# Patient Record
Sex: FEMALE | Race: WHITE | Employment: STUDENT | URBAN - METROPOLITAN AREA
[De-identification: names, ages, dates, MRNs, and addresses within clinical notes are randomized per-mention and may not be internally consistent; named-entity substitution may affect disease eponyms.]

---

## 2019-01-21 PROBLEM — F41.9 ANXIETY: Status: ACTIVE | Noted: 2019-01-21

## 2019-01-21 PROBLEM — F32.A DEPRESSION: Status: ACTIVE | Noted: 2019-01-21

## 2021-05-07 PROCEDURE — 87591 N.GONORRHOEAE DNA AMP PROB: CPT | Performed by: NURSE PRACTITIONER

## 2021-05-07 PROCEDURE — 87491 CHLMYD TRACH DNA AMP PROBE: CPT | Performed by: NURSE PRACTITIONER

## 2021-05-07 PROCEDURE — 88175 CYTOPATH C/V AUTO FLUID REDO: CPT | Performed by: NURSE PRACTITIONER

## 2021-06-24 ENCOUNTER — OFFICE VISIT (OUTPATIENT)
Dept: OCCUPATIONAL MEDICINE | Age: 21
End: 2021-06-24
Attending: PHYSICIAN ASSISTANT

## 2021-06-24 DIAGNOSIS — S39.012A LOW BACK STRAIN, INITIAL ENCOUNTER: ICD-10-CM

## 2021-06-24 DIAGNOSIS — S29.012A STRAIN OF THORACIC BACK REGION: Primary | ICD-10-CM

## 2021-07-22 ENCOUNTER — OFFICE VISIT (OUTPATIENT)
Dept: FAMILY MEDICINE CLINIC | Facility: CLINIC | Age: 21
End: 2021-07-22
Payer: COMMERCIAL

## 2021-07-22 VITALS
SYSTOLIC BLOOD PRESSURE: 110 MMHG | RESPIRATION RATE: 16 BRPM | HEIGHT: 66.5 IN | DIASTOLIC BLOOD PRESSURE: 70 MMHG | HEART RATE: 76 BPM | BODY MASS INDEX: 20.17 KG/M2 | TEMPERATURE: 98 F | WEIGHT: 127 LBS

## 2021-07-22 DIAGNOSIS — Z00.00 WELL WOMAN EXAM WITHOUT GYNECOLOGICAL EXAM: Primary | ICD-10-CM

## 2021-07-22 PROBLEM — F33.41 RECURRENT MAJOR DEPRESSION IN PARTIAL REMISSION (HCC): Status: ACTIVE | Noted: 2017-06-21

## 2021-07-22 PROBLEM — F33.41 RECURRENT MAJOR DEPRESSION IN PARTIAL REMISSION (HCC): Status: RESOLVED | Noted: 2017-06-21 | Resolved: 2021-07-22

## 2021-07-22 PROBLEM — F32.A DEPRESSION: Status: RESOLVED | Noted: 2019-01-21 | Resolved: 2021-07-22

## 2021-07-22 PROBLEM — F41.9 ANXIETY: Status: RESOLVED | Noted: 2019-01-21 | Resolved: 2021-07-22

## 2021-07-22 PROCEDURE — 99385 PREV VISIT NEW AGE 18-39: CPT | Performed by: FAMILY MEDICINE

## 2021-07-22 PROCEDURE — 3078F DIAST BP <80 MM HG: CPT | Performed by: FAMILY MEDICINE

## 2021-07-22 PROCEDURE — 3074F SYST BP LT 130 MM HG: CPT | Performed by: FAMILY MEDICINE

## 2021-07-22 PROCEDURE — 3008F BODY MASS INDEX DOCD: CPT | Performed by: FAMILY MEDICINE

## 2021-07-22 RX ORDER — ESTAZOLAM 2 MG/1
1 TABLET ORAL DAILY
COMMUNITY
Start: 2021-07-16

## 2021-07-22 NOTE — PROGRESS NOTES
SUBJECTIVE:  Patient presents with:  Establish Care: New Pt  Physical: WWE no pap     HPI:  Bump to R hand, 4th digit. Notes pruritus and some pain. Thinks this is a bug bite. Health Maintenance:  Vaccines: reviewed as below.  Indicated today:     Imm COMMENTS)    Comment:vomiting    OBJECTIVE:  PHYSICAL EXAM:   07/22/21  1404   BP: 110/70   Pulse: 76   Resp: 16   Temp: 98 °F (36.7 °C)   TempSrc: Temporal   Weight: 127 lb (57.6 kg)   Height: 5' 6.5\" (1.689 m)     Physical Examination: General appearanc

## 2021-11-17 ENCOUNTER — TELEPHONE (OUTPATIENT)
Dept: FAMILY MEDICINE CLINIC | Facility: CLINIC | Age: 21
End: 2021-11-17

## 2021-11-17 NOTE — TELEPHONE ENCOUNTER
Pt c/o pain on urination in the morning x several mornings- pain gets better throughout day. Pt also c/o increased frequency with urination, no fevers, no discharge, no odor.  Offered an appt for tomorrow- Pt declined and wants to keep appt for 11/19/21  Ad

## 2021-11-19 ENCOUNTER — OFFICE VISIT (OUTPATIENT)
Dept: FAMILY MEDICINE CLINIC | Facility: CLINIC | Age: 21
End: 2021-11-19
Payer: COMMERCIAL

## 2021-11-19 VITALS
HEART RATE: 60 BPM | HEIGHT: 66.5 IN | SYSTOLIC BLOOD PRESSURE: 110 MMHG | RESPIRATION RATE: 16 BRPM | WEIGHT: 132 LBS | TEMPERATURE: 98 F | BODY MASS INDEX: 20.96 KG/M2 | DIASTOLIC BLOOD PRESSURE: 70 MMHG

## 2021-11-19 DIAGNOSIS — N89.8 VAGINAL PRURITUS: ICD-10-CM

## 2021-11-19 DIAGNOSIS — K64.4 EXTERNAL HEMORRHOID: ICD-10-CM

## 2021-11-19 DIAGNOSIS — R35.0 URINARY FREQUENCY: Primary | ICD-10-CM

## 2021-11-19 PROCEDURE — 99214 OFFICE O/P EST MOD 30 MIN: CPT | Performed by: FAMILY MEDICINE

## 2021-11-19 PROCEDURE — 87660 TRICHOMONAS VAGIN DIR PROBE: CPT | Performed by: FAMILY MEDICINE

## 2021-11-19 PROCEDURE — 87086 URINE CULTURE/COLONY COUNT: CPT | Performed by: FAMILY MEDICINE

## 2021-11-19 PROCEDURE — 81003 URINALYSIS AUTO W/O SCOPE: CPT | Performed by: FAMILY MEDICINE

## 2021-11-19 PROCEDURE — 87480 CANDIDA DNA DIR PROBE: CPT | Performed by: FAMILY MEDICINE

## 2021-11-19 PROCEDURE — 87510 GARDNER VAG DNA DIR PROBE: CPT | Performed by: FAMILY MEDICINE

## 2021-11-19 PROCEDURE — 3008F BODY MASS INDEX DOCD: CPT | Performed by: FAMILY MEDICINE

## 2021-11-19 PROCEDURE — 3078F DIAST BP <80 MM HG: CPT | Performed by: FAMILY MEDICINE

## 2021-11-19 PROCEDURE — 3074F SYST BP LT 130 MM HG: CPT | Performed by: FAMILY MEDICINE

## 2021-11-19 RX ORDER — NITROFURANTOIN 25; 75 MG/1; MG/1
100 CAPSULE ORAL 2 TIMES DAILY
Qty: 10 CAPSULE | Refills: 0 | Status: SHIPPED | OUTPATIENT
Start: 2021-11-19

## 2021-11-19 RX ORDER — FLUCONAZOLE 150 MG/1
150 TABLET ORAL ONCE
Qty: 1 TABLET | Refills: 0 | Status: SHIPPED | OUTPATIENT
Start: 2021-11-19 | End: 2021-11-19

## 2021-11-19 NOTE — PROGRESS NOTES
Chief Complaint:  Patient presents with:  Urinary Frequency: Urinary frequency and burning    HPI:  This is a 24year old female patient presenting for Urinary Frequency (Urinary frequency and burning)    Symptoms started about a week ago.  Notes some vagin by mouth 2 (two) times daily. 10 capsule 0   • fluconazole (DIFLUCAN) 150 MG Oral Tab Take 1 tablet (150 mg total) by mouth once for 1 dose. 1 tablet 0   • MICROGESTIN 1/20 1-20 MG-MCG Oral Tab Take 1 tablet by mouth daily.        Allergies:    Bleach times daily given it is the weekend    Vaginal pruritus  -    Will start fluconazole (DIFLUCAN) 150 MG Oral Tab; Take 1 tablet (150 mg total) by mouth once for 1 dose. External hemorrhoid  To start topical preparation H, soft BMs, increae fiber, water.

## 2022-09-15 RX ORDER — OSELTAMIVIR PHOSPHATE 75 MG/1
75 CAPSULE ORAL DAILY
COMMUNITY
Start: 2022-04-25

## 2022-10-12 PROCEDURE — 87491 CHLMYD TRACH DNA AMP PROBE: CPT

## 2022-10-12 PROCEDURE — 87591 N.GONORRHOEAE DNA AMP PROB: CPT

## 2023-03-16 ENCOUNTER — HOSPITAL ENCOUNTER (EMERGENCY)
Facility: HOSPITAL | Age: 23
Discharge: HOME | End: 2023-03-16
Attending: EMERGENCY MEDICINE
Payer: COMMERCIAL

## 2023-03-16 ENCOUNTER — PATIENT MESSAGE (OUTPATIENT)
Dept: FAMILY MEDICINE CLINIC | Facility: CLINIC | Age: 23
End: 2023-03-16

## 2023-03-16 VITALS
HEIGHT: 66 IN | HEART RATE: 58 BPM | SYSTOLIC BLOOD PRESSURE: 118 MMHG | RESPIRATION RATE: 16 BRPM | TEMPERATURE: 98 F | OXYGEN SATURATION: 100 % | WEIGHT: 120 LBS | DIASTOLIC BLOOD PRESSURE: 80 MMHG | BODY MASS INDEX: 19.29 KG/M2

## 2023-03-16 DIAGNOSIS — R55 SYNCOPE AND COLLAPSE: Primary | ICD-10-CM

## 2023-03-16 DIAGNOSIS — R11.2 NAUSEA VOMITING AND DIARRHEA: ICD-10-CM

## 2023-03-16 DIAGNOSIS — K92.2 UPPER GI BLEED: ICD-10-CM

## 2023-03-16 DIAGNOSIS — R19.7 NAUSEA VOMITING AND DIARRHEA: ICD-10-CM

## 2023-03-16 LAB
ALBUMIN SERPL-MCNC: 4.2 G/DL (ref 3.4–5)
ALBUMIN/GLOB SERPL: 1.1 {RATIO} (ref 1–2)
ALP LIVER SERPL-CCNC: 57 U/L
ALT SERPL-CCNC: 72 U/L
ANION GAP SERPL CALC-SCNC: 7 MMOL/L (ref 0–18)
AST SERPL-CCNC: 37 U/L (ref 15–37)
ATRIAL RATE: 56 BPM
B-HCG UR QL: NEGATIVE
BASOPHILS # BLD AUTO: 0.02 X10(3) UL (ref 0–0.2)
BASOPHILS NFR BLD AUTO: 0.3 %
BILIRUB SERPL-MCNC: 1.1 MG/DL (ref 0.1–2)
BILIRUB UR QL STRIP.AUTO: NEGATIVE
BUN BLD-MCNC: 9 MG/DL (ref 7–18)
CALCIUM BLD-MCNC: 8.9 MG/DL (ref 8.5–10.1)
CHLORIDE SERPL-SCNC: 108 MMOL/L (ref 98–112)
CLARITY UR REFRACT.AUTO: CLEAR
CO2 SERPL-SCNC: 24 MMOL/L (ref 21–32)
COLOR UR AUTO: YELLOW
CREAT BLD-MCNC: 0.88 MG/DL
EOSINOPHIL # BLD AUTO: 0.09 X10(3) UL (ref 0–0.7)
EOSINOPHIL NFR BLD AUTO: 1.4 %
ERYTHROCYTE [DISTWIDTH] IN BLOOD BY AUTOMATED COUNT: 11.7 %
GFR SERPLBLD BASED ON 1.73 SQ M-ARVRAT: 95 ML/MIN/1.73M2 (ref 60–?)
GLOBULIN PLAS-MCNC: 3.7 G/DL (ref 2.8–4.4)
GLUCOSE BLD-MCNC: 92 MG/DL (ref 70–99)
GLUCOSE UR STRIP.AUTO-MCNC: NEGATIVE MG/DL
HCT VFR BLD AUTO: 44 %
HGB BLD-MCNC: 15.1 G/DL
HYALINE CASTS #/AREA URNS AUTO: PRESENT /LPF
IMM GRANULOCYTES # BLD AUTO: 0.02 X10(3) UL (ref 0–1)
IMM GRANULOCYTES NFR BLD: 0.3 %
KETONES UR STRIP.AUTO-MCNC: 20 MG/DL
LEUKOCYTE ESTERASE UR QL STRIP.AUTO: NEGATIVE
LIPASE SERPL-CCNC: 39 U/L (ref 13–75)
LYMPHOCYTES # BLD AUTO: 1.9 X10(3) UL (ref 1–4)
LYMPHOCYTES NFR BLD AUTO: 29.1 %
MCH RBC QN AUTO: 29.5 PG (ref 26–34)
MCHC RBC AUTO-ENTMCNC: 34.3 G/DL (ref 31–37)
MCV RBC AUTO: 86.1 FL
MONOCYTES # BLD AUTO: 0.45 X10(3) UL (ref 0.1–1)
MONOCYTES NFR BLD AUTO: 6.9 %
NEUTROPHILS # BLD AUTO: 4.04 X10 (3) UL (ref 1.5–7.7)
NEUTROPHILS # BLD AUTO: 4.04 X10(3) UL (ref 1.5–7.7)
NEUTROPHILS NFR BLD AUTO: 62 %
NITRITE UR QL STRIP.AUTO: NEGATIVE
OSMOLALITY SERPL CALC.SUM OF ELEC: 286 MOSM/KG (ref 275–295)
P AXIS: 77 DEGREES
P-R INTERVAL: 150 MS
PH UR STRIP.AUTO: 5 [PH] (ref 5–8)
PLATELET # BLD AUTO: 293 10(3)UL (ref 150–450)
POTASSIUM SERPL-SCNC: 3.6 MMOL/L (ref 3.5–5.1)
PROT SERPL-MCNC: 7.9 G/DL (ref 6.4–8.2)
PROT UR STRIP.AUTO-MCNC: NEGATIVE MG/DL
Q-T INTERVAL: 422 MS
QRS DURATION: 88 MS
QTC CALCULATION (BEZET): 407 MS
R AXIS: 96 DEGREES
RBC # BLD AUTO: 5.11 X10(6)UL
SODIUM SERPL-SCNC: 139 MMOL/L (ref 136–145)
SP GR UR STRIP.AUTO: 1 (ref 1–1.03)
T AXIS: 55 DEGREES
UROBILINOGEN UR STRIP.AUTO-MCNC: <2 MG/DL
VENTRICULAR RATE: 56 BPM
WBC # BLD AUTO: 6.5 X10(3) UL (ref 4–11)

## 2023-03-16 PROCEDURE — 99284 EMERGENCY DEPT VISIT MOD MDM: CPT

## 2023-03-16 PROCEDURE — 96360 HYDRATION IV INFUSION INIT: CPT

## 2023-03-16 PROCEDURE — 93010 ELECTROCARDIOGRAM REPORT: CPT

## 2023-03-16 PROCEDURE — 83690 ASSAY OF LIPASE: CPT | Performed by: EMERGENCY MEDICINE

## 2023-03-16 PROCEDURE — 80053 COMPREHEN METABOLIC PANEL: CPT | Performed by: EMERGENCY MEDICINE

## 2023-03-16 PROCEDURE — 93005 ELECTROCARDIOGRAM TRACING: CPT

## 2023-03-16 PROCEDURE — 96361 HYDRATE IV INFUSION ADD-ON: CPT

## 2023-03-16 PROCEDURE — 81001 URINALYSIS AUTO W/SCOPE: CPT | Performed by: EMERGENCY MEDICINE

## 2023-03-16 PROCEDURE — 85025 COMPLETE CBC W/AUTO DIFF WBC: CPT | Performed by: EMERGENCY MEDICINE

## 2023-03-16 PROCEDURE — 81025 URINE PREGNANCY TEST: CPT

## 2023-03-16 RX ORDER — OMEPRAZOLE 20 MG/1
20 TABLET, DELAYED RELEASE ORAL DAILY
Qty: 30 TABLET | Refills: 0 | Status: SHIPPED | OUTPATIENT
Start: 2023-03-16 | End: 2023-03-20

## 2023-03-16 RX ORDER — ONDANSETRON 4 MG/1
4 TABLET, ORALLY DISINTEGRATING ORAL EVERY 4 HOURS PRN
Qty: 10 TABLET | Refills: 0 | Status: SHIPPED | OUTPATIENT
Start: 2023-03-16 | End: 2023-03-23

## 2023-03-16 NOTE — ED INITIAL ASSESSMENT (HPI)
Pt c/o abd pain with n/v/d since Saturday, states pain more to the lower abd. Pt reports she woke up at 0515 to use the bathroom, had abd pain and diarrhea and passed out. Per boyfriend, \"found her on the bathroom floor next to the bath tub, was out for about a minute. \" Pt denies pain to head, neck and back. She denies dizziness/lightheadedness, denies CP, she denies JUAN CARLOS.  Reports emesis this am appears dark colored

## 2023-03-17 NOTE — TELEPHONE ENCOUNTER
From: Shine Gaston  To: Jodi Singh DO  Sent: 3/16/2023 5:37 PM CDT  Subject: Recent hospital visit    Hi Dr. Ashley Ordonez,  I was in the hospital today due to GI distress, likely from the flu. While I was at the hospital they did an EKG because I fainted. The EKG results did not seem like everything was normal, therefore, I was wondering if you would be able to look at my results and please let me know if I should make an appointment with you to go over it or if it is nothing to worry about.    Thanks for your help,  Africa Rose

## 2023-03-20 ENCOUNTER — OFFICE VISIT (OUTPATIENT)
Dept: FAMILY MEDICINE CLINIC | Facility: CLINIC | Age: 23
End: 2023-03-20
Payer: COMMERCIAL

## 2023-03-20 VITALS
WEIGHT: 122 LBS | RESPIRATION RATE: 16 BRPM | HEART RATE: 60 BPM | HEIGHT: 66 IN | SYSTOLIC BLOOD PRESSURE: 98 MMHG | DIASTOLIC BLOOD PRESSURE: 64 MMHG | TEMPERATURE: 97 F | BODY MASS INDEX: 19.61 KG/M2

## 2023-03-20 DIAGNOSIS — R55 SYNCOPE, UNSPECIFIED SYNCOPE TYPE: ICD-10-CM

## 2023-03-20 DIAGNOSIS — R10.30 LOWER ABDOMINAL PAIN: Primary | ICD-10-CM

## 2023-03-20 DIAGNOSIS — R11.2 NAUSEA AND VOMITING, UNSPECIFIED VOMITING TYPE: ICD-10-CM

## 2023-03-20 LAB
ATRIAL RATE: 53 BPM
P AXIS: 72 DEGREES
P-R INTERVAL: 152 MS
Q-T INTERVAL: 414 MS
QRS DURATION: 92 MS
QTC CALCULATION (BEZET): 388 MS
R AXIS: 93 DEGREES
T AXIS: 49 DEGREES
VENTRICULAR RATE: 53 BPM

## 2023-03-20 PROCEDURE — 99214 OFFICE O/P EST MOD 30 MIN: CPT | Performed by: NURSE PRACTITIONER

## 2023-03-20 PROCEDURE — 3078F DIAST BP <80 MM HG: CPT | Performed by: NURSE PRACTITIONER

## 2023-03-20 PROCEDURE — 93000 ELECTROCARDIOGRAM COMPLETE: CPT | Performed by: NURSE PRACTITIONER

## 2023-03-20 PROCEDURE — 3008F BODY MASS INDEX DOCD: CPT | Performed by: NURSE PRACTITIONER

## 2023-03-20 PROCEDURE — 3074F SYST BP LT 130 MM HG: CPT | Performed by: NURSE PRACTITIONER

## 2023-03-20 RX ORDER — OMEPRAZOLE 20 MG/1
20 CAPSULE, DELAYED RELEASE ORAL DAILY
COMMUNITY
Start: 2023-03-16

## 2023-03-20 RX ORDER — DICYCLOMINE HCL 20 MG
20 TABLET ORAL 3 TIMES DAILY PRN
Qty: 21 TABLET | Refills: 0 | Status: SHIPPED | OUTPATIENT
Start: 2023-03-20

## 2023-03-22 ENCOUNTER — PATIENT MESSAGE (OUTPATIENT)
Dept: FAMILY MEDICINE CLINIC | Facility: CLINIC | Age: 23
End: 2023-03-22

## 2023-03-22 DIAGNOSIS — R10.84 GENERALIZED ABDOMINAL PAIN: Primary | ICD-10-CM

## 2023-03-22 NOTE — TELEPHONE ENCOUNTER
From: Shine Gaston  To: Jaime Akhtar NP  Sent: 3/22/2023 1:53 PM CDT  Subject: Pain and discomfort moved    Hi Landen,  I am now having pain at the bottom and middle of my rib cage and it hurts when I move and when I touch it. The right side is more sensitive as well. I am just getting concerned because the medication does not seem to be helping and I am supposed to leave for my trip on Monday. Please let me know if there is something else I should do.    Thanks,  Ronnie Ulloa

## 2023-03-23 ENCOUNTER — TELEPHONE (OUTPATIENT)
Dept: FAMILY MEDICINE CLINIC | Facility: CLINIC | Age: 23
End: 2023-03-23

## 2023-03-23 NOTE — TELEPHONE ENCOUNTER
Patient was seen 03/20/23 and states that her symptoms have worsened, was told to contact the office is no improvement. Patient spoke with the on call doctor last night and was prescribed an additional medication that she stated has not helped.  Patient looking for next step

## 2023-03-23 NOTE — TELEPHONE ENCOUNTER
Talked to pt at 7 PM after call on evening. ABD pain anfter 5 days of N/V and pain now more upper abd. She is worried about jaundice or GB, appears more bloating. Bentyl not helpong.  Will add simethicome and reassess for possible US GB in 204 days if no better    Lab Results   Component Value Date    ALT 72 (H) 03/16/2023    ALT 21 03/31/2014    AST 37 03/16/2023    AST 16 03/31/2014

## 2023-07-28 ENCOUNTER — TELEPHONE (OUTPATIENT)
Dept: FAMILY MEDICINE CLINIC | Facility: CLINIC | Age: 23
End: 2023-07-28

## 2023-07-28 NOTE — TELEPHONE ENCOUNTER
Pt scheduled apt for 9/15 through my chart with following reason    Fainted again, dizziness, nausea, blurry vision, rapid heart rate, sweating.  Same symptoms in March     Should she be seen sooner

## 2023-07-31 ENCOUNTER — OFFICE VISIT (OUTPATIENT)
Dept: FAMILY MEDICINE CLINIC | Facility: CLINIC | Age: 23
End: 2023-07-31
Payer: COMMERCIAL

## 2023-07-31 VITALS
WEIGHT: 122.81 LBS | SYSTOLIC BLOOD PRESSURE: 100 MMHG | HEIGHT: 66 IN | DIASTOLIC BLOOD PRESSURE: 60 MMHG | TEMPERATURE: 97 F | OXYGEN SATURATION: 99 % | RESPIRATION RATE: 16 BRPM | BODY MASS INDEX: 19.74 KG/M2 | HEART RATE: 62 BPM

## 2023-07-31 DIAGNOSIS — R55 SYNCOPE, UNSPECIFIED SYNCOPE TYPE: Primary | ICD-10-CM

## 2023-07-31 DIAGNOSIS — Z01.84 IMMUNITY STATUS TESTING: ICD-10-CM

## 2023-08-07 ENCOUNTER — LABORATORY ENCOUNTER (OUTPATIENT)
Dept: LAB | Age: 23
End: 2023-08-07
Attending: FAMILY MEDICINE
Payer: COMMERCIAL

## 2023-08-07 DIAGNOSIS — R55 SYNCOPE, UNSPECIFIED SYNCOPE TYPE: ICD-10-CM

## 2023-08-07 DIAGNOSIS — Z01.84 IMMUNITY STATUS TESTING: ICD-10-CM

## 2023-08-07 LAB
ALBUMIN SERPL-MCNC: 4.6 G/DL (ref 3.4–5)
ALBUMIN/GLOB SERPL: 1.4 {RATIO} (ref 1–2)
ALP LIVER SERPL-CCNC: 46 U/L
ALT SERPL-CCNC: 38 U/L
ANION GAP SERPL CALC-SCNC: 8 MMOL/L (ref 0–18)
AST SERPL-CCNC: 19 U/L (ref 15–37)
BASOPHILS # BLD AUTO: 0.05 X10(3) UL (ref 0–0.2)
BASOPHILS NFR BLD AUTO: 0.9 %
BILIRUB SERPL-MCNC: 2.2 MG/DL (ref 0.1–2)
BUN BLD-MCNC: 12 MG/DL (ref 7–18)
CALCIUM BLD-MCNC: 9.4 MG/DL (ref 8.5–10.1)
CHLORIDE SERPL-SCNC: 102 MMOL/L (ref 98–112)
CO2 SERPL-SCNC: 25 MMOL/L (ref 21–32)
CREAT BLD-MCNC: 1.06 MG/DL
EGFRCR SERPLBLD CKD-EPI 2021: 76 ML/MIN/1.73M2 (ref 60–?)
EOSINOPHIL # BLD AUTO: 0.17 X10(3) UL (ref 0–0.7)
EOSINOPHIL NFR BLD AUTO: 3.1 %
ERYTHROCYTE [DISTWIDTH] IN BLOOD BY AUTOMATED COUNT: 11.9 %
FASTING STATUS PATIENT QL REPORTED: YES
GLOBULIN PLAS-MCNC: 3.3 G/DL (ref 2.8–4.4)
GLUCOSE BLD-MCNC: 86 MG/DL (ref 70–99)
HBV SURFACE AB SER QL: NONREACTIVE
HBV SURFACE AB SERPL IA-ACNC: <3.1 MIU/ML
HCT VFR BLD AUTO: 42.1 %
HGB BLD-MCNC: 14.2 G/DL
IMM GRANULOCYTES # BLD AUTO: 0.01 X10(3) UL (ref 0–1)
IMM GRANULOCYTES NFR BLD: 0.2 %
LYMPHOCYTES # BLD AUTO: 2.32 X10(3) UL (ref 1–4)
LYMPHOCYTES NFR BLD AUTO: 42.6 %
MCH RBC QN AUTO: 29.7 PG (ref 26–34)
MCHC RBC AUTO-ENTMCNC: 33.7 G/DL (ref 31–37)
MCV RBC AUTO: 88.1 FL
MONOCYTES # BLD AUTO: 0.33 X10(3) UL (ref 0.1–1)
MONOCYTES NFR BLD AUTO: 6.1 %
NEUTROPHILS # BLD AUTO: 2.56 X10 (3) UL (ref 1.5–7.7)
NEUTROPHILS # BLD AUTO: 2.56 X10(3) UL (ref 1.5–7.7)
NEUTROPHILS NFR BLD AUTO: 47.1 %
OSMOLALITY SERPL CALC.SUM OF ELEC: 279 MOSM/KG (ref 275–295)
PLATELET # BLD AUTO: 296 10(3)UL (ref 150–450)
POTASSIUM SERPL-SCNC: 3.7 MMOL/L (ref 3.5–5.1)
PROT SERPL-MCNC: 7.9 G/DL (ref 6.4–8.2)
RBC # BLD AUTO: 4.78 X10(6)UL
RUBV IGG SER QL: POSITIVE
RUBV IGG SER-ACNC: 475.5 IU/ML (ref 10–?)
SODIUM SERPL-SCNC: 135 MMOL/L (ref 136–145)
TSI SER-ACNC: 2.15 MIU/ML (ref 0.36–3.74)
WBC # BLD AUTO: 5.4 X10(3) UL (ref 4–11)

## 2023-08-07 PROCEDURE — 86706 HEP B SURFACE ANTIBODY: CPT | Performed by: NURSE PRACTITIONER

## 2023-08-07 PROCEDURE — 86762 RUBELLA ANTIBODY: CPT | Performed by: NURSE PRACTITIONER

## 2023-08-07 PROCEDURE — 86735 MUMPS ANTIBODY: CPT | Performed by: NURSE PRACTITIONER

## 2023-08-07 PROCEDURE — 80050 GENERAL HEALTH PANEL: CPT | Performed by: NURSE PRACTITIONER

## 2023-08-07 PROCEDURE — 86765 RUBEOLA ANTIBODY: CPT | Performed by: NURSE PRACTITIONER

## 2023-08-07 PROCEDURE — 86787 VARICELLA-ZOSTER ANTIBODY: CPT | Performed by: NURSE PRACTITIONER

## 2023-08-09 LAB
MEV IGG SER-ACNC: 127 AU/ML (ref 16.5–?)
MUV IGG SER IA-ACNC: 76.2 AU/ML (ref 11–?)
VZV IGG SER IA-ACNC: 426.1 (ref 165–?)

## 2023-08-10 ENCOUNTER — HOSPITAL ENCOUNTER (OUTPATIENT)
Dept: CV DIAGNOSTICS | Facility: HOSPITAL | Age: 23
Discharge: HOME | End: 2023-08-10
Attending: NURSE PRACTITIONER
Payer: COMMERCIAL

## 2023-08-10 DIAGNOSIS — R55 SYNCOPE, UNSPECIFIED SYNCOPE TYPE: ICD-10-CM

## 2023-08-10 PROCEDURE — 93226 XTRNL ECG REC<48 HR SCAN A/R: CPT | Performed by: NURSE PRACTITIONER

## 2023-08-10 PROCEDURE — 93225 XTRNL ECG REC<48 HRS REC: CPT | Performed by: NURSE PRACTITIONER

## 2023-08-11 ENCOUNTER — PATIENT MESSAGE (OUTPATIENT)
Dept: FAMILY MEDICINE CLINIC | Facility: CLINIC | Age: 23
End: 2023-08-11

## 2023-08-11 NOTE — TELEPHONE ENCOUNTER
From: Sophie Velez  To: Xenia Ridley  Sent: 8/11/2023 11:30 AM CDT  Subject: Hepatitis B Vaccine    Hi Ginny De Jesus,    I spoke with Dl Sevilla who said you should have the series of 3 vaccines.  You would receive the first one, the 2nd, one month after the first and the 3rd, six months after the first.    Livia Gunter RN

## 2023-08-11 NOTE — PROGRESS NOTES
Discussed test results with Quinten Peers by phone giving her Kelle Denton PA-C comments and recommendations. Quinten Peers verbalized understanding. I spoke with King RAGLAND and he said Quinten Peers should have the Hep B series. I sent Quinten Peers a [employer] message with that information.

## 2023-08-11 NOTE — TELEPHONE ENCOUNTER
OK for Tdap and Booster of Hep B, although, should check with school regarding Hep B. Most schools want entire series repeated and not just booster. Either is ok with me, depending on which school will accept. Vaccines ordered.

## 2023-08-17 ENCOUNTER — NURSE ONLY (OUTPATIENT)
Dept: FAMILY MEDICINE CLINIC | Facility: CLINIC | Age: 23
End: 2023-08-17
Payer: COMMERCIAL

## 2023-08-17 VITALS — TEMPERATURE: 99 F

## 2023-08-17 DIAGNOSIS — Z23 NEED FOR VACCINATION: Primary | ICD-10-CM

## 2023-08-17 PROCEDURE — 90746 HEPB VACCINE 3 DOSE ADULT IM: CPT | Performed by: NURSE PRACTITIONER

## 2023-08-17 PROCEDURE — 87591 N.GONORRHOEAE DNA AMP PROB: CPT

## 2023-08-17 PROCEDURE — 87491 CHLMYD TRACH DNA AMP PROBE: CPT

## 2023-08-17 PROCEDURE — 90472 IMMUNIZATION ADMIN EACH ADD: CPT | Performed by: NURSE PRACTITIONER

## 2023-08-17 PROCEDURE — 90471 IMMUNIZATION ADMIN: CPT | Performed by: NURSE PRACTITIONER

## 2023-08-17 PROCEDURE — 90715 TDAP VACCINE 7 YRS/> IM: CPT | Performed by: NURSE PRACTITIONER

## 2023-08-17 NOTE — PROGRESS NOTES
Patient is here today for Hep B and TDAP per Brionna Dunham NP. Patient given VIS. 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   Hep B given in the right deltoid and TDAP given in the left deltoid. Patient given a copy of immunizations.    She left the office without complaint

## 2023-08-29 ENCOUNTER — OFFICE VISIT (OUTPATIENT)
Dept: FAMILY MEDICINE CLINIC | Facility: CLINIC | Age: 23
End: 2023-08-29
Payer: COMMERCIAL

## 2023-08-29 VITALS
SYSTOLIC BLOOD PRESSURE: 128 MMHG | RESPIRATION RATE: 16 BRPM | BODY MASS INDEX: 19.44 KG/M2 | WEIGHT: 121 LBS | HEART RATE: 64 BPM | DIASTOLIC BLOOD PRESSURE: 68 MMHG | HEIGHT: 66 IN | OXYGEN SATURATION: 98 % | TEMPERATURE: 97 F

## 2023-08-29 DIAGNOSIS — H57.02 ANISOCORIA: ICD-10-CM

## 2023-08-29 DIAGNOSIS — R42 DIZZINESS: Primary | ICD-10-CM

## 2023-08-29 DIAGNOSIS — G44.52 NEW DAILY PERSISTENT HEADACHE: ICD-10-CM

## 2023-08-29 DIAGNOSIS — E87.1 HYPONATREMIA: ICD-10-CM

## 2023-08-29 PROCEDURE — 3008F BODY MASS INDEX DOCD: CPT | Performed by: FAMILY MEDICINE

## 2023-08-29 PROCEDURE — 3078F DIAST BP <80 MM HG: CPT | Performed by: FAMILY MEDICINE

## 2023-08-29 PROCEDURE — 99214 OFFICE O/P EST MOD 30 MIN: CPT | Performed by: FAMILY MEDICINE

## 2023-08-29 PROCEDURE — 3074F SYST BP LT 130 MM HG: CPT | Performed by: FAMILY MEDICINE

## 2023-09-06 ENCOUNTER — HOSPITAL ENCOUNTER (OUTPATIENT)
Dept: MRI IMAGING | Age: 23
Discharge: HOME | End: 2023-09-06
Attending: FAMILY MEDICINE
Payer: COMMERCIAL

## 2023-09-06 DIAGNOSIS — G44.52 NEW DAILY PERSISTENT HEADACHE: ICD-10-CM

## 2023-09-06 DIAGNOSIS — H57.02 ANISOCORIA: ICD-10-CM

## 2023-09-06 DIAGNOSIS — R42 DIZZINESS: ICD-10-CM

## 2023-09-06 PROCEDURE — 70551 MRI BRAIN STEM W/O DYE: CPT | Performed by: FAMILY MEDICINE

## 2023-09-08 ENCOUNTER — LABORATORY ENCOUNTER (OUTPATIENT)
Dept: LAB | Age: 23
End: 2023-09-08
Attending: FAMILY MEDICINE
Payer: COMMERCIAL

## 2023-09-08 DIAGNOSIS — R42 DIZZINESS: ICD-10-CM

## 2023-09-08 DIAGNOSIS — E87.1 HYPONATREMIA: ICD-10-CM

## 2023-09-08 LAB
ANION GAP SERPL CALC-SCNC: 6 MMOL/L (ref 0–18)
BUN BLD-MCNC: 8 MG/DL (ref 7–18)
CALCIUM BLD-MCNC: 10 MG/DL (ref 8.5–10.1)
CHLORIDE SERPL-SCNC: 104 MMOL/L (ref 98–112)
CO2 SERPL-SCNC: 28 MMOL/L (ref 21–32)
CREAT BLD-MCNC: 0.7 MG/DL
DEPRECATED HBV CORE AB SER IA-ACNC: 120.9 NG/ML
EGFRCR SERPLBLD CKD-EPI 2021: 125 ML/MIN/1.73M2 (ref 60–?)
FASTING STATUS PATIENT QL REPORTED: NO
GLUCOSE BLD-MCNC: 73 MG/DL (ref 70–99)
OSMOLALITY SERPL CALC.SUM OF ELEC: 283 MOSM/KG (ref 275–295)
POTASSIUM SERPL-SCNC: 4.2 MMOL/L (ref 3.5–5.1)
SODIUM SERPL-SCNC: 138 MMOL/L (ref 136–145)

## 2023-09-08 PROCEDURE — 82728 ASSAY OF FERRITIN: CPT | Performed by: FAMILY MEDICINE

## 2023-09-08 PROCEDURE — 80048 BASIC METABOLIC PNL TOTAL CA: CPT | Performed by: FAMILY MEDICINE

## 2023-09-15 ENCOUNTER — OFFICE VISIT (OUTPATIENT)
Dept: FAMILY MEDICINE CLINIC | Facility: CLINIC | Age: 23
End: 2023-09-15
Payer: COMMERCIAL

## 2023-09-15 ENCOUNTER — LAB ENCOUNTER (OUTPATIENT)
Dept: LAB | Age: 23
End: 2023-09-15
Attending: FAMILY MEDICINE
Payer: COMMERCIAL

## 2023-09-15 VITALS
RESPIRATION RATE: 16 BRPM | HEART RATE: 84 BPM | BODY MASS INDEX: 18.96 KG/M2 | DIASTOLIC BLOOD PRESSURE: 62 MMHG | SYSTOLIC BLOOD PRESSURE: 116 MMHG | OXYGEN SATURATION: 99 % | TEMPERATURE: 97 F | WEIGHT: 118 LBS | HEIGHT: 66 IN

## 2023-09-15 DIAGNOSIS — R10.12 LUQ PAIN: ICD-10-CM

## 2023-09-15 DIAGNOSIS — R63.4 UNEXPLAINED WEIGHT LOSS: ICD-10-CM

## 2023-09-15 DIAGNOSIS — G93.5: Primary | ICD-10-CM

## 2023-09-15 DIAGNOSIS — R19.7 DIARRHEA, UNSPECIFIED TYPE: ICD-10-CM

## 2023-09-15 LAB — IGA SERPL-MCNC: 105 MG/DL (ref 70–312)

## 2023-09-15 PROCEDURE — 86364 TISS TRNSGLTMNASE EA IG CLAS: CPT | Performed by: FAMILY MEDICINE

## 2023-09-15 PROCEDURE — 3008F BODY MASS INDEX DOCD: CPT | Performed by: FAMILY MEDICINE

## 2023-09-15 PROCEDURE — 99214 OFFICE O/P EST MOD 30 MIN: CPT | Performed by: FAMILY MEDICINE

## 2023-09-15 PROCEDURE — 3074F SYST BP LT 130 MM HG: CPT | Performed by: FAMILY MEDICINE

## 2023-09-15 PROCEDURE — 3078F DIAST BP <80 MM HG: CPT | Performed by: FAMILY MEDICINE

## 2023-09-15 PROCEDURE — 82784 ASSAY IGA/IGD/IGG/IGM EACH: CPT | Performed by: FAMILY MEDICINE

## 2023-09-18 LAB — TTG IGA SER-ACNC: <0.2 U/ML (ref ?–7)

## 2023-09-27 ENCOUNTER — OFFICE VISIT (OUTPATIENT)
Dept: ORTHOPEDICS CLINIC | Facility: CLINIC | Age: 23
End: 2023-09-27
Payer: COMMERCIAL

## 2023-09-27 VITALS — BODY MASS INDEX: 18.64 KG/M2 | WEIGHT: 116 LBS | HEIGHT: 66 IN

## 2023-09-27 DIAGNOSIS — S49.92XA INJURY OF LEFT ACROMIOCLAVICULAR JOINT, INITIAL ENCOUNTER: Primary | ICD-10-CM

## 2023-09-27 PROCEDURE — 99203 OFFICE O/P NEW LOW 30 MIN: CPT | Performed by: PHYSICIAN ASSISTANT

## 2023-09-27 PROCEDURE — 3008F BODY MASS INDEX DOCD: CPT | Performed by: PHYSICIAN ASSISTANT

## 2023-09-29 ENCOUNTER — OFFICE VISIT (OUTPATIENT)
Dept: SURGERY | Facility: CLINIC | Age: 23
End: 2023-09-29
Payer: COMMERCIAL

## 2023-09-29 VITALS
DIASTOLIC BLOOD PRESSURE: 80 MMHG | BODY MASS INDEX: 18.64 KG/M2 | SYSTOLIC BLOOD PRESSURE: 110 MMHG | HEIGHT: 66 IN | WEIGHT: 116 LBS

## 2023-09-29 DIAGNOSIS — R40.20 LOSS OF CONSCIOUSNESS (HCC): Primary | ICD-10-CM

## 2023-09-29 PROCEDURE — 3008F BODY MASS INDEX DOCD: CPT | Performed by: NEUROLOGICAL SURGERY

## 2023-09-29 PROCEDURE — 3079F DIAST BP 80-89 MM HG: CPT | Performed by: NEUROLOGICAL SURGERY

## 2023-09-29 PROCEDURE — 3074F SYST BP LT 130 MM HG: CPT | Performed by: NEUROLOGICAL SURGERY

## 2023-09-29 PROCEDURE — 99204 OFFICE O/P NEW MOD 45 MIN: CPT | Performed by: NEUROLOGICAL SURGERY

## 2023-10-04 DIAGNOSIS — R56.9 SEIZURE (HCC): ICD-10-CM

## 2023-10-04 DIAGNOSIS — R55 SYNCOPE AND COLLAPSE: Primary | ICD-10-CM

## 2023-10-16 ENCOUNTER — MED REC SCAN ONLY (OUTPATIENT)
Dept: ORTHOPEDICS CLINIC | Facility: CLINIC | Age: 23
End: 2023-10-16

## 2023-10-20 ENCOUNTER — LAB ENCOUNTER (OUTPATIENT)
Dept: NEUROLOGY | Facility: HOSPITAL | Age: 23
End: 2023-10-20

## 2023-10-20 ENCOUNTER — NURSE ONLY (OUTPATIENT)
Dept: ELECTROPHYSIOLOGY | Facility: HOSPITAL | Age: 23
End: 2023-10-20
Attending: HOSPITALIST
Payer: COMMERCIAL

## 2023-10-20 DIAGNOSIS — R56.9 SEIZURE (HCC): ICD-10-CM

## 2023-10-20 DIAGNOSIS — R55 SYNCOPE AND COLLAPSE: ICD-10-CM

## 2023-10-20 PROCEDURE — 95819 EEG AWAKE AND ASLEEP: CPT

## 2023-11-21 DIAGNOSIS — R55 SYNCOPE, UNSPECIFIED SYNCOPE TYPE: ICD-10-CM

## 2023-11-21 DIAGNOSIS — R94.01 ABNORMAL EEG: Primary | ICD-10-CM

## 2023-11-21 NOTE — PROCEDURES
[facility] - ELECTROENCEPHALOGRAM (EEG) REPORT  Patient Name:  Eve Garcia   MRN / CSN:  [id number] / [id number]   Date of Birth / Age:  9/12/2000 /  23 year old   Encounter Date:  10/20/2023         METHODS:  Twenty-two electrodes were applied according to the 10-20-electrode placement system on this routine audio-video EEG. EKG monitoring, monopolar and bipolar montages are routinely utilized. The record was obtained on a digital system.      OBJECT:  This is a 23 year old year-old female with an episode of syncope    The EEG was requested to assess for epileptiform activity and change in mental status.     State(s) of consciousness: awake and asleep    Relevant medications:       FINDINGS:  1) Background: A bilateral and symmetric posterior-dominant background rhythm of 9.5 to 10.5 Hz with an amplitude of 40-60 microvolts is seen.General awake background was organized and largely consisted of alpha frequencies with an AP gradient. Intermittent frontally predominant poly-sharp/spike waves appreciated.   2) Sleep: symmetric sleep transients.   3) Abnormalities: poly-sharp/spikes bifrontally   4) Activation:                    HV:  performed and failed to show a pathological response                    IPS:  performed and failed to show a pathological response     IMPRESSION:   This was an abnormal routine EEG. Poly-sharps/spikes may indicate a seizure tendency. However, no clear seizures captured. Would recommend ambulatory EEG.     SIGNATURES:  Timothy Zhao MD   [facility]

## 2023-12-18 ENCOUNTER — NURSE ONLY (OUTPATIENT)
Dept: ELECTROPHYSIOLOGY | Facility: HOSPITAL | Age: 23
End: 2023-12-18
Attending: HOSPITALIST
Payer: COMMERCIAL

## 2023-12-18 DIAGNOSIS — R55 SYNCOPE, UNSPECIFIED SYNCOPE TYPE: ICD-10-CM

## 2023-12-18 DIAGNOSIS — R94.01 ABNORMAL EEG: ICD-10-CM

## 2023-12-18 PROCEDURE — 95708 EEG WO VID EA 12-26HR UNMNTR: CPT

## 2023-12-18 PROCEDURE — 95700 EEG CONT REC W/VID EEG TECH: CPT

## 2023-12-18 PROCEDURE — 95719 EEG PHYS/QHP EA INCR W/O VID: CPT

## 2023-12-19 ENCOUNTER — NURSE ONLY (OUTPATIENT)
Dept: ELECTROPHYSIOLOGY | Facility: HOSPITAL | Age: 23
End: 2023-12-19
Attending: HOSPITALIST
Payer: COMMERCIAL

## 2023-12-20 PROBLEM — R94.01 ABNORMAL EEG: Status: ACTIVE | Noted: 2023-12-20

## 2024-01-31 NOTE — PROCEDURES
[facility] - ELECTROENCEPHALOGRAM (EEG) REPORT  Patient Name:  Eve Garcia   MRN / CSN:  [id number] / [id number]   Date of Birth / Age:  9/12/2000 /  23 year old   Encounter Date:  12/18/23 to 12/19/23         METHODS:  Twenty-two electrodes were applied according to the 10-20-electrode placement system on this ambulatory audio-video EEG. EKG monitoring, monopolar and bipolar montages are routinely utilized. The record was obtained on a digital system.      OBJECT:  This is a 23 year old year-old female with altered mental status     The EEG was requested to assess for epileptiform activity and change in mental status.     State(s) of consciousness: awake, drowsy and asleep    Relevant medications:       FINDINGS:  1) Background: A symmetric and reactive posterior-dominant background rhythm of 9.5 to 10.5 Hz with an amplitude of 40-60 microvolts is seen. General awake background largely consisted of alpha activity with an organized AP gradient.   2) Sleep: Normal, symmetrical sleep complexes were noted.  3) Abnormalities: rare frontally predominate poly-sharps/spikes seen in both wakefulness and sleep  4) Activation:                    HV:  performed and was unrevealing                     IPS:  performed and was unrevealing.     IMPRESSION:   This was a mildly abnormal ambulatory EEG. Non- specific poly-sharps seen form the frontal electrodes, which may indicate an underlying seizure tendency. However, no clear seizures captured.     Report covers  Start 1233 on 12/18/2023  End    1228 on 12/19/2023    SIGNATURES:  Timothy Zhao MD   [facility]

## 2024-08-05 ENCOUNTER — TELEPHONE (OUTPATIENT)
Dept: FAMILY MEDICINE CLINIC | Facility: CLINIC | Age: 24
End: 2024-08-05

## 2024-08-05 NOTE — TELEPHONE ENCOUNTER
Patient mom on the phone and is in [place] on her honeymoon. Her symptoms started on the Aug 2nd. Yesterday went to a medical clinic out there and they prescribed her two antibiotic and diarrhea cramping medication. She been an iv bag and lost her vision due to medication of a shot and was told that was the side affect. Mom has her paper work/ blood test/ stool sample from yesterday. And picture of medication she had yesterday not medicine that they gave her today    Fever  Throwing up   Diarrhea was the start and now it been bloody diarrhea.     Needs some medical guide tamare on what to do

## 2024-08-05 NOTE — TELEPHONE ENCOUNTER
Mom reports patient is in [place].  Pt is currently in a medical facility.   Pt with diarrhea since 8/2. Yesterday it turned into bloody diarrhea, fever. Started vomiting today. Fatigue.     Was given IVF yesterday +   Spasmoctyl 40 - 1 tab q8h x 3 days (otilonium bromide [antispasmodic])  Sertal compuesto 1 tab q8h x 3 days  Cipro 500mg 1 tab BID x 7 days  Metronidazole 500mg 1 tab q8h x 7 days    Last night sx worsened.   Having episode of bloody diarrhea < q2h.     Stool results tested positive for a parasite     Will defer to management being offered by current medical facility as they are likely very familiar with how to treat this. Repeat labs being done today. Mom will be sure H&H is being monitored given persistent bloody diarrhea. To f/u in office as necessary upon return.

## 2024-08-13 ENCOUNTER — OFFICE VISIT (OUTPATIENT)
Dept: FAMILY MEDICINE CLINIC | Facility: CLINIC | Age: 24
End: 2024-08-13
Payer: COMMERCIAL

## 2024-08-13 VITALS
TEMPERATURE: 97 F | OXYGEN SATURATION: 100 % | DIASTOLIC BLOOD PRESSURE: 64 MMHG | HEART RATE: 70 BPM | SYSTOLIC BLOOD PRESSURE: 100 MMHG | BODY MASS INDEX: 20 KG/M2 | WEIGHT: 122 LBS | RESPIRATION RATE: 16 BRPM

## 2024-08-13 DIAGNOSIS — R19.7 BLOODY DIARRHEA: Primary | ICD-10-CM

## 2024-08-13 PROCEDURE — 99213 OFFICE O/P EST LOW 20 MIN: CPT | Performed by: FAMILY MEDICINE

## 2024-08-13 PROCEDURE — 3074F SYST BP LT 130 MM HG: CPT | Performed by: FAMILY MEDICINE

## 2024-08-13 PROCEDURE — 3078F DIAST BP <80 MM HG: CPT | Performed by: FAMILY MEDICINE

## 2024-08-13 NOTE — PROGRESS NOTES
Chief Complaint:  No chief complaint on file.      HPI:  This is a 23 year old female patient presenting for No chief complaint on file.    Was in [place]. Symptoms started 11 days ago. Developed nausea, fever and bloody diarrhea. Was seen by an UC there. Was given antibiotics. Had blood and fecal test. Review of records indicate she had an elevated CRP, +blood in stool, neg parasites and yeast. Was given antibiotics (Cipro, flagyl). Felt worse the next day. Returned to the urgent care. Given anti-nausea and diarrheal meds, IV abx, cont oral abx. Additional testing was negative of viral stool pathogen. CRP was again elevated (90).  Completed PO abx for 7 days (last day yesterday).   Symptoms have improved but still having loose stools. Not formed. Notes bloating and some abdominal discomfort. Is taking a probiotic and intestinal barrier builder Of note, it typically GF/DF but did not eat this way specifically while on vacation.  did not have symptoms.     Health Maintenance:  Health Maintenance   Topic Date Due    Annual Physical  Never done    HPV Vaccines (1 - 3-dose series) Never done    COVID-19 Vaccine (3 - 2023-24 season) 09/01/2023    Annual Depression Screening  Never done    Pap Smear  05/07/2024    Chlamydia Screening  08/17/2024    Influenza Vaccine (1) 10/01/2024    DTaP,Tdap,and Td Vaccines (2 - Td or Tdap) 08/17/2033    Pneumococcal Vaccine: Birth to 64yrs  Aged Out       ROS: Review of systems performed and negative unless stated in HPI.    Past medical, family and social history reviewed and listed as below.    HISTORY:  Past Medical History:    Anxiety    Depression    Generalized anxiety disorder    Panic disorder without agoraphobia    Recurrent major depression in partial remission (HCC)      History reviewed. No pertinent surgical history.   Family History   Problem Relation Age of Onset    Prostate Cancer Father     No Known Problems Mother     Lipids Maternal Grandmother     Kidney  Disease Maternal Grandmother     Cancer Maternal Grandmother         skin (unknown type)    Lipids Paternal Grandmother     Endometriosis Sister     Depression Sister     Prostate Cancer Paternal Grandfather       Social History     Socioeconomic History    Marital status: Single   Tobacco Use    Smoking status: Never    Smokeless tobacco: Never   Vaping Use    Vaping status: Never Used   Substance and Sexual Activity    Alcohol use: No    Drug use: No    Sexual activity: Yes     Partners: Male     Birth control/protection: OCP        No current outpatient medications on file.     Allergies:  Allergies   Allergen Reactions    Hydrocodone-Acetaminophen DIZZINESS and NAUSEA AND VOMITING    Bleach     Pumpkin     Sulfa Antibiotics OTHER (SEE COMMENTS)     vomiting       EXAM:  Vitals:    08/13/24 1150   BP: 100/64   Pulse: 70   Resp: 16   Temp: 97 °F (36.1 °C)   SpO2: 100%   Weight: 122 lb (55.3 kg)     GENERAL: vitals reviewed and listed above, alert, oriented, appears well hydrated and in no acute distress  HEENT: atraumatic, conjunctiva clear, no obvious abnormalities on inspection   LUNGS: clear to auscultation bilaterally, no wheezes, rales or rhonchi, good air movement  CV: HRRR, no murmurs, no peripheral edema   ABD: Soft, hyperactive BSx4, mildly diffuse tenderness, non-distended, no guarding or rebound tenderness  EXTREMITIES: warm and well perfused  PSYCH: pleasant and cooperative, no obvious depression or anxiety    ASSESSMENT AND PLAN:  Discussed the following assessment   Problem List Items Addressed This Visit    None  Visit Diagnoses       Bloody diarrhea    -  Primary            Advised the following:  Diagnoses and all orders for this visit:    Bloody diarrhea  Likely bacterial. Now treated with cipro/flagyl x7 days. Since no longer bloody and not as profuse, will monitor for now. May consider testing (would include parasites, giardia) if returns or does not improve. Cont probiotic.     Concerning  signs and symptoms that warrant returning to the clinic reviewed and patient demonstrated understanding.    Chiara Chavez DO  8/13/2024 12:04 PM  Family Medicine    Note to Patient  The 21st Century Cures Act makes medical notes like these available to patients in the interest of transparency. However, be advised this is a medical document and is intended as [id number] communication; it is written in medical language and may appear blunt, direct, or contain abbreviations or verbiage that are unfamiliar. Medical documents are intended to carry relevant information, facts as evident, and the clinical opinion of the practitioner.     This report has been produced using speech recognition software, and may contain errors related to grammar, punctuation, spelling, words or phrases unrecognized or not translated appropriately to text; these errors may be referred to the dictating provider for further clarification and/or addendum as needed.

## 2024-08-21 ENCOUNTER — HOSPITAL ENCOUNTER (EMERGENCY)
Facility: HOSPITAL | Age: 24
Discharge: HOME | End: 2024-08-21
Attending: EMERGENCY MEDICINE
Payer: COMMERCIAL

## 2024-08-21 ENCOUNTER — PATIENT MESSAGE (OUTPATIENT)
Dept: FAMILY MEDICINE CLINIC | Facility: CLINIC | Age: 24
End: 2024-08-21

## 2024-08-21 VITALS
RESPIRATION RATE: 18 BRPM | HEIGHT: 66 IN | SYSTOLIC BLOOD PRESSURE: 110 MMHG | BODY MASS INDEX: 19.29 KG/M2 | DIASTOLIC BLOOD PRESSURE: 66 MMHG | TEMPERATURE: 99 F | OXYGEN SATURATION: 100 % | WEIGHT: 120 LBS | HEART RATE: 71 BPM

## 2024-08-21 DIAGNOSIS — R19.7 DIARRHEA, UNSPECIFIED TYPE: Primary | ICD-10-CM

## 2024-08-21 LAB
ALBUMIN SERPL-MCNC: 5.1 G/DL (ref 3.2–4.8)
ALBUMIN/GLOB SERPL: 2 {RATIO} (ref 1–2)
ALP LIVER SERPL-CCNC: 60 U/L
ALT SERPL-CCNC: 25 U/L
ANION GAP SERPL CALC-SCNC: 4 MMOL/L (ref 0–18)
AST SERPL-CCNC: 20 U/L (ref ?–34)
B-HCG UR QL: NEGATIVE
BASOPHILS # BLD AUTO: 0.03 X10(3) UL (ref 0–0.2)
BASOPHILS NFR BLD AUTO: 0.5 %
BILIRUB SERPL-MCNC: 3.8 MG/DL (ref 0.3–1.2)
BUN BLD-MCNC: 8 MG/DL (ref 9–23)
C DIFF TOX B STL QL: NEGATIVE
CALCIUM BLD-MCNC: 10.2 MG/DL (ref 8.7–10.4)
CHLORIDE SERPL-SCNC: 105 MMOL/L (ref 98–112)
CO2 SERPL-SCNC: 29 MMOL/L (ref 21–32)
CREAT BLD-MCNC: 0.66 MG/DL
EGFRCR SERPLBLD CKD-EPI 2021: 126 ML/MIN/1.73M2 (ref 60–?)
EOSINOPHIL # BLD AUTO: 0.03 X10(3) UL (ref 0–0.7)
EOSINOPHIL NFR BLD AUTO: 0.5 %
ERYTHROCYTE [DISTWIDTH] IN BLOOD BY AUTOMATED COUNT: 12.3 %
GLOBULIN PLAS-MCNC: 2.5 G/DL (ref 2–3.5)
GLUCOSE BLD-MCNC: 94 MG/DL (ref 70–99)
HCT VFR BLD AUTO: 39.2 %
HGB BLD-MCNC: 13.5 G/DL
IMM GRANULOCYTES # BLD AUTO: 0.01 X10(3) UL (ref 0–1)
IMM GRANULOCYTES NFR BLD: 0.2 %
LIPASE SERPL-CCNC: 39 U/L (ref 12–53)
LYMPHOCYTES # BLD AUTO: 1.68 X10(3) UL (ref 1–4)
LYMPHOCYTES NFR BLD AUTO: 27.9 %
MCH RBC QN AUTO: 30 PG (ref 26–34)
MCHC RBC AUTO-ENTMCNC: 34.4 G/DL (ref 31–37)
MCV RBC AUTO: 87.1 FL
MONOCYTES # BLD AUTO: 0.47 X10(3) UL (ref 0.1–1)
MONOCYTES NFR BLD AUTO: 7.8 %
NEUTROPHILS # BLD AUTO: 3.8 X10 (3) UL (ref 1.5–7.7)
NEUTROPHILS # BLD AUTO: 3.8 X10(3) UL (ref 1.5–7.7)
NEUTROPHILS NFR BLD AUTO: 63.1 %
OSMOLALITY SERPL CALC.SUM OF ELEC: 284 MOSM/KG (ref 275–295)
PLATELET # BLD AUTO: 318 10(3)UL (ref 150–450)
POTASSIUM SERPL-SCNC: 3.6 MMOL/L (ref 3.5–5.1)
PROT SERPL-MCNC: 7.6 G/DL (ref 5.7–8.2)
RBC # BLD AUTO: 4.5 X10(6)UL
SODIUM SERPL-SCNC: 138 MMOL/L (ref 136–145)
WBC # BLD AUTO: 6 X10(3) UL (ref 4–11)

## 2024-08-21 PROCEDURE — 87045 FECES CULTURE AEROBIC BACT: CPT | Performed by: EMERGENCY MEDICINE

## 2024-08-21 PROCEDURE — 99284 EMERGENCY DEPT VISIT MOD MDM: CPT

## 2024-08-21 PROCEDURE — 87272 CRYPTOSPORIDIUM AG IF: CPT | Performed by: EMERGENCY MEDICINE

## 2024-08-21 PROCEDURE — 87077 CULTURE AEROBIC IDENTIFY: CPT | Performed by: EMERGENCY MEDICINE

## 2024-08-21 PROCEDURE — 36415 COLL VENOUS BLD VENIPUNCTURE: CPT

## 2024-08-21 PROCEDURE — 85025 COMPLETE CBC W/AUTO DIFF WBC: CPT | Performed by: EMERGENCY MEDICINE

## 2024-08-21 PROCEDURE — 80053 COMPREHEN METABOLIC PANEL: CPT | Performed by: EMERGENCY MEDICINE

## 2024-08-21 PROCEDURE — 87186 SC STD MICRODIL/AGAR DIL: CPT | Performed by: EMERGENCY MEDICINE

## 2024-08-21 PROCEDURE — 87184 SC STD DISK METHOD PER PLATE: CPT | Performed by: EMERGENCY MEDICINE

## 2024-08-21 PROCEDURE — 87507 IADNA-DNA/RNA PROBE TQ 12-25: CPT | Performed by: EMERGENCY MEDICINE

## 2024-08-21 PROCEDURE — 83690 ASSAY OF LIPASE: CPT | Performed by: EMERGENCY MEDICINE

## 2024-08-21 PROCEDURE — 87329 GIARDIA AG IA: CPT | Performed by: EMERGENCY MEDICINE

## 2024-08-21 PROCEDURE — 87493 C DIFF AMPLIFIED PROBE: CPT | Performed by: EMERGENCY MEDICINE

## 2024-08-21 PROCEDURE — 99283 EMERGENCY DEPT VISIT LOW MDM: CPT

## 2024-08-21 PROCEDURE — 81025 URINE PREGNANCY TEST: CPT

## 2024-08-21 RX ORDER — GARLIC EXTRACT 500 MG
1 CAPSULE ORAL DAILY
COMMUNITY

## 2024-08-21 NOTE — ED INITIAL ASSESSMENT (HPI)
Pt was traveling in [place] and came back on 8/11; seen in hospital there for possible bacterial GI infection; pt experiencing bloody diarrhea; tx'd w/fluids, antibiotics oral and IV; pt f/u w/her primary here and cont. To monitor; last night symptoms came back w/loose, green, mucous stool; mild abd pain, bloating, gas; denies current fevers

## 2024-08-21 NOTE — TELEPHONE ENCOUNTER
Called and talked to patient her symptoms are getting worse so she is going to the ED to be seen for this problem

## 2024-08-22 NOTE — ED QUICK NOTES
2015: This RN went to patient's room to explain how the doctor put an order in for IV fluids. Pt stated \"I don't think I need the IV fluids. Leonor been drinking a lot of water so I really don't think I'm dehydrated.\" This RN informed patient how the iv fluids would be beneficial but it is ultimately her choice if she wants it. Pt stated \" Yeah, I really don't think I need it\". This RN told patient that I will let the doctor know and we can just hold off on it but if she changes her mine to let this RN know. Pt states she understands. Dr. Landeros notified.

## 2024-08-22 NOTE — ED PROVIDER NOTES
Patient Seen in: [facility] Emergency Department      History     Chief Complaint   Patient presents with    Abdomen/Flank Pain     Stated Complaint: diarrhea, upset stomach, nausea, [place] sx *    Subjective:   HPI  Patient is a 24 yo F with a history of SARANYA who presents to ED for evaluation of diarrhea. Patient states that 3 weeks ago she was in [place] for her honeymoon and developed bloody diarrhea, crampy abdominal pain, vomiting, and fever on 8/2. Patient went to hospital in [facility]. I reviewed pt's medical records from [facility] which showed normal hgb, elevated CRP, +Blood in stool, negative parasites. Patient was given IV antibiotics and then course of PO antibiotics. She then returned to  again in [place]. At that time, CRP was still elevated. Pt completed oral antibiotics for 7 days. Symptoms improved but she is still having mild crampy abd pain and 5 episodes of loose NB stool daily. Pt states her stool is more formed now with mucous. Pt has been taking probiotics. No sick contacts. No recent fevers or vomiting. No urinary symptoms.     Patient called PCP office today who sent her to ED. Chart reviewed from 8/13. Patient was evaluated at this time and PCP recommended monitoring symptoms, considering further testing if not improved.     Objective:   Past Medical History:    Anxiety    Depression    Generalized anxiety disorder    Panic disorder without agoraphobia    Recurrent major depression in partial remission (HCC)              Past Surgical History:   Procedure Laterality Date    Anterior cruciate ligament repair Right     no complications                Social History     Socioeconomic History    Marital status: Single   Tobacco Use    Smoking status: Never    Smokeless tobacco: Never   Vaping Use    Vaping status: Never Used   Substance and Sexual Activity    Alcohol use: Yes     Comment: socially    Drug use: No    Sexual activity: Yes     Partners:  Male     Birth control/protection: OCP              Review of Systems    Positive for stated Chief Complaint: Abdomen/Flank Pain    Other systems are as noted in HPI.  Constitutional and vital signs reviewed.      All other systems reviewed and negative except as noted above.    Physical Exam     ED Triage Vitals [08/21/24 1627]   /66   Pulse 71   Resp 18   Temp 98.7 °F (37.1 °C)   Temp src Oral   SpO2 100 %   O2 Device None (Room air)       Current Vitals:   Vital Signs  BP: 110/66  Pulse: 71  Resp: 18  Temp: 98.7 °F (37.1 °C)  Temp src: Oral  MAP (mmHg): 81    Oxygen Therapy  SpO2: 100 %  O2 Device: None (Room air)            Physical Exam  Vitals and nursing note reviewed.   Constitutional:       General: She is not in acute distress.     Appearance: She is not ill-appearing.   HENT:      Head: Normocephalic and atraumatic.      Mouth/Throat:      Mouth: Mucous membranes are moist.   Eyes:      Extraocular Movements: Extraocular movements intact.      Pupils: Pupils are equal, round, and reactive to light.   Cardiovascular:      Rate and Rhythm: Normal rate and regular rhythm.   Pulmonary:      Effort: Pulmonary effort is normal.   Abdominal:      General: There is no distension.      Palpations: Abdomen is soft.      Tenderness: There is abdominal tenderness in the left lower quadrant.      Comments: Mild LLQ TTP   Musculoskeletal:      Cervical back: Neck supple.      Right lower leg: No edema.      Left lower leg: No edema.   Skin:     General: Skin is warm and dry.      Capillary Refill: Capillary refill takes less than 2 seconds.   Neurological:      General: No focal deficit present.      Mental Status: She is alert.   Psychiatric:         Mood and Affect: Mood normal.           ED Course     Labs Reviewed   COMP METABOLIC PANEL (14) - Abnormal; Notable for the following components:       Result Value    BUN 8 (*)     Bilirubin, Total 3.8 (*)     Albumin 5.1 (*)     All other components within normal  limits   GI STOOL PANEL BY PCR - Abnormal; Notable for the following components:    Salmonella Pcr Positive (*)     All other components within normal limits    Narrative:     The GI Panel tests for Campylobacter species jejuni, coli, and   upsaliensis; all species, subspecies, and serovars of Salmonella;   and Vibrio species parahaemolyticus, vulnificus, and cholera.   It does NOT test for Aeromonas or Edwardsiella species.     LIPASE - Normal   POCT PREGNANCY URINE - Normal   C. DIFFICILE(TOXIGENIC)PCR - Normal   CBC WITH DIFFERENTIAL WITH PLATELET   RAINBOW DRAW LAVENDER   RAINBOW DRAW LIGHT GREEN   RAINBOW DRAW BLUE   RAINBOW DRAW GOLD   STOOL CULTURE, SALMONELLA ONLY          MDM      Patient is a 24 yo F with a history of SARANYA who presents to ED for evaluation of diarrhea. Patient states that 3 weeks ago she was in [place] for her honeymoon and developed bloody diarrhea, crampy abdominal pain, vomiting, and fever on 8/2. Pt treated with 7 day course of cipro/flagyl with some improvement but still having loose stool. No longer bloody. Pt having mild crampy abd pain. No recent fevers.     Patient is afebrile, HDS, satting well on RA. On exam patient is nontoxic appearing. She has very mild LLQ TTP on exam. Differential includes but not limited to bacterial illness, gastroenteritis, parasite. Given pt was recently on antibiotics, c. Diff is also in the differential.     Labs are unremarkable. No leukocytosis. Normal hgb. CMP unremarkable. Lipase wnl. Upreg neg. Stool PCR, c. Diff sent and pending. Will hold off on further antibiotics at this time and wait for stool culture results. No indication for imaging given reassuring vitals, labs, and exam. Pt is tolerating PO and would like to hold off on IVF. Patient is stable for discharge home with close PCP f/u. Discussed strict return precautions and supportive care. Patient verbalized understanding and agreement of plan. I also provided with GI f/u as needed.                     Medical Decision Making      Disposition and Plan     Clinical Impression:  1. Diarrhea, unspecified type         Disposition:  Discharge  8/21/2024  8:50 pm    Follow-up:  Janessa Escalante MD  [address]  [facility] [address] [phone]    Schedule an appointment as soon as possible for a visit      Chiara Chavez DO  [address]  [facility] [address]  [facility] [address]  [phone]    Schedule an appointment as soon as possible for a visit in 2 day(s)            Medications Prescribed:  Discharge Medication List as of 8/21/2024  9:01 PM

## 2024-08-22 NOTE — DISCHARGE INSTRUCTIONS
You were seen in the emergency department.  Your labs are reassuring.  You will be called if your stool cultures are positive.  Please continue to stay hydrated plenty of fluids.  Return to the emergency department if you develop fevers, if you are vomiting blood, black or bloody stool, worsening pain, or any new concerns or worsening symptoms.  Please follow closely with your primary care physician and GI.

## 2024-08-26 ENCOUNTER — TELEPHONE (OUTPATIENT)
Dept: FAMILY MEDICINE CLINIC | Facility: CLINIC | Age: 24
End: 2024-08-26

## 2024-08-26 DIAGNOSIS — R19.7 BLOODY DIARRHEA: Primary | ICD-10-CM

## 2024-08-26 NOTE — TELEPHONE ENCOUNTER
Pt reports no abx were given by ER. Abx given in [place] and were finished on 8/11/2024. No abx since. She was given Cipro and flagyl in [place].     Diarrhea and abd pain worsened last week. Went to ER for sx on 8/21/2024.     Pt reports diarrhea and abd pain has improved over the weekend. She continues to take a digestive enzyme and probiotic daily.     Last episode of loose/soft stool was yesterday. Had solid stool today. Pt is hoping to complete first stool culture tomorrow as long as she doesn't have any more episodes of diarrhea between now and then.     Routed to Dr. Chavez

## 2024-08-26 NOTE — TELEPHONE ENCOUNTER
Reena salcedo from [facility] Dept regarding pt's stool culture that tested positive for salmonella. Pt is a . She needs two negative specimens to return to work. Was treated with abx when she was in [place].     Can do next test 48 hours after diarrhea has stopped (it hasn't yet). Second test can be done 24 hours after first test.     Routed to Dr. Kathy rodríguez to order repeat stool cultures?

## 2024-08-28 ENCOUNTER — LAB ENCOUNTER (OUTPATIENT)
Dept: LAB | Facility: HOSPITAL | Age: 24
End: 2024-08-28
Attending: FAMILY MEDICINE
Payer: COMMERCIAL

## 2024-08-28 DIAGNOSIS — R19.7 BLOODY DIARRHEA: ICD-10-CM

## 2024-08-28 LAB
ADENOVIRUS F 40/41 PCR: NEGATIVE
ASTROVIRUS PCR: NEGATIVE
C CAYETANENSIS DNA SPEC QL NAA+PROBE: NEGATIVE
CAMPY SP DNA.DIARRHEA STL QL NAA+PROBE: NEGATIVE
CRYPTOSP DNA SPEC QL NAA+PROBE: NEGATIVE
EAEC PAA PLAS AGGR+AATA ST NAA+NON-PRB: NEGATIVE
EC STX1+STX2 + H7 FLIC SPEC NAA+PROBE: NEGATIVE
ENTAMOEBA HISTOLYTICA PCR: NEGATIVE
EPEC EAE GENE STL QL NAA+NON-PROBE: NEGATIVE
ETEC LTA+ST1A+ST1B TOX ST NAA+NON-PROBE: NEGATIVE
GIARDIA LAMBLIA PCR: NEGATIVE
NOROVIRUS GI/GII PCR: NEGATIVE
P SHIGELLOIDES DNA STL QL NAA+PROBE: NEGATIVE
ROTAVIRUS A PCR: NEGATIVE
SALMONELLA DNA SPEC QL NAA+PROBE: POSITIVE
SAPOVIRUS PCR: NEGATIVE
SHIGELLA SP+EIEC IPAH ST NAA+NON-PROBE: NEGATIVE
V CHOLERAE DNA SPEC QL NAA+PROBE: NEGATIVE
VIBRIO DNA SPEC NAA+PROBE: NEGATIVE
YERSINIA DNA SPEC NAA+PROBE: NEGATIVE

## 2024-08-28 PROCEDURE — 87045 FECES CULTURE AEROBIC BACT: CPT

## 2024-08-28 PROCEDURE — 87077 CULTURE AEROBIC IDENTIFY: CPT

## 2024-08-28 PROCEDURE — 87015 SPECIMEN INFECT AGNT CONCNTJ: CPT

## 2024-08-28 PROCEDURE — 87046 STOOL CULTR AEROBIC BACT EA: CPT

## 2024-08-28 PROCEDURE — 87184 SC STD DISK METHOD PER PLATE: CPT

## 2024-08-28 PROCEDURE — 87186 SC STD MICRODIL/AGAR DIL: CPT

## 2024-08-28 PROCEDURE — 87427 SHIGA-LIKE TOXIN AG IA: CPT

## 2024-08-29 ENCOUNTER — LAB ENCOUNTER (OUTPATIENT)
Dept: LAB | Facility: HOSPITAL | Age: 24
End: 2024-08-29
Attending: FAMILY MEDICINE
Payer: COMMERCIAL

## 2024-08-29 ENCOUNTER — TELEPHONE (OUTPATIENT)
Dept: FAMILY MEDICINE CLINIC | Facility: CLINIC | Age: 24
End: 2024-08-29

## 2024-08-29 DIAGNOSIS — R19.7 DIARRHEA OF PRESUMED INFECTIOUS ORIGIN: ICD-10-CM

## 2024-08-29 DIAGNOSIS — R19.7 DIARRHEA OF PRESUMED INFECTIOUS ORIGIN: Primary | ICD-10-CM

## 2024-08-29 PROCEDURE — 87015 SPECIMEN INFECT AGNT CONCNTJ: CPT

## 2024-08-29 PROCEDURE — 87427 SHIGA-LIKE TOXIN AG IA: CPT

## 2024-08-29 PROCEDURE — 87046 STOOL CULTR AEROBIC BACT EA: CPT

## 2024-08-29 PROCEDURE — 87077 CULTURE AEROBIC IDENTIFY: CPT

## 2024-08-29 PROCEDURE — 87045 FECES CULTURE AEROBIC BACT: CPT

## 2024-09-05 ENCOUNTER — TELEPHONE (OUTPATIENT)
Dept: FAMILY MEDICINE CLINIC | Facility: CLINIC | Age: 24
End: 2024-09-05

## 2024-09-05 DIAGNOSIS — A02.0 SALMONELLA ENTERITIS: Primary | ICD-10-CM

## 2024-09-05 NOTE — TELEPHONE ENCOUNTER
Patient requesting stool culture salmonella order   Patient stated that she needs 2 labs order in the system for 24 hrs apart

## 2024-09-10 ENCOUNTER — TELEPHONE (OUTPATIENT)
Dept: FAMILY MEDICINE CLINIC | Facility: CLINIC | Age: 24
End: 2024-09-10

## 2024-09-10 ENCOUNTER — LAB ENCOUNTER (OUTPATIENT)
Dept: LAB | Facility: HOSPITAL | Age: 24
End: 2024-09-10
Attending: FAMILY MEDICINE
Payer: COMMERCIAL

## 2024-09-10 ENCOUNTER — HOSPITAL ENCOUNTER (EMERGENCY)
Facility: HOSPITAL | Age: 24
Discharge: HOME | End: 2024-09-10
Attending: EMERGENCY MEDICINE
Payer: COMMERCIAL

## 2024-09-10 VITALS
RESPIRATION RATE: 18 BRPM | WEIGHT: 120 LBS | OXYGEN SATURATION: 100 % | TEMPERATURE: 98 F | DIASTOLIC BLOOD PRESSURE: 76 MMHG | HEIGHT: 66 IN | SYSTOLIC BLOOD PRESSURE: 106 MMHG | BODY MASS INDEX: 19.29 KG/M2 | HEART RATE: 60 BPM

## 2024-09-10 DIAGNOSIS — A02.0 SALMONELLA ENTERITIS: ICD-10-CM

## 2024-09-10 DIAGNOSIS — H53.8 BLURRY VISION, BILATERAL: Primary | ICD-10-CM

## 2024-09-10 LAB
ANION GAP SERPL CALC-SCNC: 7 MMOL/L (ref 0–18)
BASOPHILS # BLD AUTO: 0.03 X10(3) UL (ref 0–0.2)
BASOPHILS NFR BLD AUTO: 0.5 %
BUN BLD-MCNC: 8 MG/DL (ref 9–23)
CALCIUM BLD-MCNC: 10 MG/DL (ref 8.7–10.4)
CHLORIDE SERPL-SCNC: 106 MMOL/L (ref 98–112)
CO2 SERPL-SCNC: 26 MMOL/L (ref 21–32)
CREAT BLD-MCNC: 0.76 MG/DL
EGFRCR SERPLBLD CKD-EPI 2021: 112 ML/MIN/1.73M2 (ref 60–?)
EOSINOPHIL # BLD AUTO: 0.03 X10(3) UL (ref 0–0.7)
EOSINOPHIL NFR BLD AUTO: 0.5 %
ERYTHROCYTE [DISTWIDTH] IN BLOOD BY AUTOMATED COUNT: 12.3 %
GLUCOSE BLD-MCNC: 88 MG/DL (ref 70–99)
HCT VFR BLD AUTO: 39.1 %
HGB BLD-MCNC: 13.7 G/DL
IMM GRANULOCYTES # BLD AUTO: 0.01 X10(3) UL (ref 0–1)
IMM GRANULOCYTES NFR BLD: 0.2 %
LYMPHOCYTES # BLD AUTO: 2.18 X10(3) UL (ref 1–4)
LYMPHOCYTES NFR BLD AUTO: 36.4 %
MCH RBC QN AUTO: 29.7 PG (ref 26–34)
MCHC RBC AUTO-ENTMCNC: 35 G/DL (ref 31–37)
MCV RBC AUTO: 84.6 FL
MONOCYTES # BLD AUTO: 0.4 X10(3) UL (ref 0.1–1)
MONOCYTES NFR BLD AUTO: 6.7 %
NEUTROPHILS # BLD AUTO: 3.34 X10 (3) UL (ref 1.5–7.7)
NEUTROPHILS # BLD AUTO: 3.34 X10(3) UL (ref 1.5–7.7)
NEUTROPHILS NFR BLD AUTO: 55.7 %
OSMOLALITY SERPL CALC.SUM OF ELEC: 286 MOSM/KG (ref 275–295)
PLATELET # BLD AUTO: 296 10(3)UL (ref 150–450)
POTASSIUM SERPL-SCNC: 3.6 MMOL/L (ref 3.5–5.1)
RBC # BLD AUTO: 4.62 X10(6)UL
SODIUM SERPL-SCNC: 139 MMOL/L (ref 136–145)
WBC # BLD AUTO: 6 X10(3) UL (ref 4–11)

## 2024-09-10 PROCEDURE — 85025 COMPLETE CBC W/AUTO DIFF WBC: CPT | Performed by: EMERGENCY MEDICINE

## 2024-09-10 PROCEDURE — 87077 CULTURE AEROBIC IDENTIFY: CPT

## 2024-09-10 PROCEDURE — 99283 EMERGENCY DEPT VISIT LOW MDM: CPT

## 2024-09-10 PROCEDURE — 87045 FECES CULTURE AEROBIC BACT: CPT

## 2024-09-10 PROCEDURE — 87186 SC STD MICRODIL/AGAR DIL: CPT

## 2024-09-10 PROCEDURE — 80048 BASIC METABOLIC PNL TOTAL CA: CPT | Performed by: EMERGENCY MEDICINE

## 2024-09-10 PROCEDURE — 87184 SC STD DISK METHOD PER PLATE: CPT

## 2024-09-10 PROCEDURE — 87046 STOOL CULTR AEROBIC BACT EA: CPT

## 2024-09-10 PROCEDURE — 36415 COLL VENOUS BLD VENIPUNCTURE: CPT

## 2024-09-10 NOTE — ED INITIAL ASSESSMENT (HPI)
pt reports pt reports vision changes has been going on since Thursday, reports blurry vision and dizziness. Reports photosensitivity.

## 2024-09-10 NOTE — ED INITIAL ASSESSMENT (HPI)
pt reports pt reports vision changes has been going on since Thursday, reports blurry vision and dizziness. Reports photosensitivity.       Pt reports that she was diagnosed with salmonella 1x month ago, reports that she wants to make sure its not \"attacking her nervous system\".

## 2024-09-10 NOTE — TELEPHONE ENCOUNTER
Patient went to ER today for blurry vision. Was told to f/u with ophthalmology.     Pt reports she dropped stool sample off at lab today. She is no longer having diarrhea. Await results.    Pt is going to call eye doctor now. If unable to get a timely appt, she prefers to keep appt in office tomorrow. She will call back to let us know if she should like to cancel. She verbalized understanding and agrees with plan.

## 2024-09-10 NOTE — ED PROVIDER NOTES
Patient Seen in: [facility] Emergency Department      History     Chief Complaint   Patient presents with    Dizziness    Eye Visual Problem     Stated Complaint: blurred vision, dizziness since Thursday    Subjective:   HPI    This is a 23-year-old female past medical's anxiety, depression, panic disorder who presents with vision changes since last Thursday states at times it is blurry.  She has some light sensitivity.  She wears glasses for far vision.  She states over the last week or so she noticed that her vision seems blurry when she looks off in the distance.  She had a dull headache yesterday which is resolved.    No fevers or chills.  She was diagnosed with Salmonella about a month ago and she still working on getting that cleared.  She is feeling much better.  She is having no vomiting or diarrhea.  She states she could not get into see an ophthalmologist till the middle of October so she came here.  She presents for further evaluation.    Objective:   Past Medical History:    Anxiety    Depression    Generalized anxiety disorder    Panic disorder without agoraphobia    Recurrent major depression in partial remission (HCC)              Past Surgical History:   Procedure Laterality Date    Anterior cruciate ligament repair Right     no complications                Social History     Socioeconomic History    Marital status: Single   Tobacco Use    Smoking status: Never    Smokeless tobacco: Never   Vaping Use    Vaping status: Never Used   Substance and Sexual Activity    Alcohol use: Yes     Comment: socially    Drug use: No    Sexual activity: Yes     Partners: Male     Birth control/protection: OCP              Review of Systems    Positive for stated Chief Complaint: Dizziness and Eye Visual Problem    Other systems are as noted in HPI.  Constitutional and vital signs reviewed.      All other systems reviewed and negative except as noted above.    Physical Exam     ED Triage Vitals [09/10/24 1228]    /71   Pulse 80   Resp 18   Temp 97.5 °F (36.4 °C)   Temp src Temporal   SpO2 100 %   O2 Device None (Room air)       Current Vitals:   Vital Signs  BP: 106/76  Pulse: 60  Resp: 18  Temp: 97.5 °F (36.4 °C)  Temp src: Temporal  MAP (mmHg): 85    Oxygen Therapy  SpO2: 100 %  O2 Device: None (Room air)            Physical Exam    GENERAL: Awake, alert oriented x3, nontoxic appearing.   SKIN: Normal, warm, and dry.  HEENT:  Pupils equally round and reactive to light. Conjuctiva clear.  Extraocular motions intact.  No nystagmus.  Oropharynx is clear and moist.   Lungs: Clear to auscultation bilaterally with no rales, no retractions, and no wheezing.  HEART:  Regular rate and rhythm. S1 and S2. No murmurs, no rubs or gallops.   ABDOMEN: Soft, nontender and nondistended. Normoactive bowel sounds. No rebound. No guarding.   EXTREMITIES: Warm with brisk capillary refill.         ED Course     Labs Reviewed   BASIC METABOLIC PANEL (8) - Abnormal; Notable for the following components:       Result Value    BUN 8 (*)     All other components within normal limits   CBC WITH DIFFERENTIAL WITH PLATELET                      MDM        This is a 23-year-old female past medical's anxiety, depression, panic disorder who presents with vision changes since last Thursday states at times it is blurry.  Differential could include hyperglycemia, worsening baseline vision.    Visual acuity was unremarkable.  20/30 both eyes corrected.      Basic labs were checked.  CBC: White blood cell count 6.0.  Hemoglobin 13.7.  Platelet 296.  BMP: BUN 8.  Creatinine 0.7 per glucose 88.  Bicarb 26.      Patient is recommended to follow-up with ophthalmology.  She can try her ophthalmologist if she is having difficulty and outpatient follow-up.  Patient was referred to Dr. Balbuena.  Discharged in good condition.      Disposition and Plan     Clinical Impression:  1. Blurry vision, bilateral         Disposition:  Discharge  9/10/2024  3:27  pm    Follow-up:  Chiara Chavez DO  [address]  [address]  [facility] [id number] [phone]    Follow up      [facility]  [address]  [facility] [address]  Call today            Medications Prescribed:  Current Discharge Medication List

## 2024-09-10 NOTE — TELEPHONE ENCOUNTER
Patient call requesting speak to a nurse for.    Sudden vision changes, salmonella if we are moving in the correct direction       Patient schedule appt .  9/11/2024 12:00 with Dr. Rico.

## 2024-09-11 ENCOUNTER — LAB ENCOUNTER (OUTPATIENT)
Dept: LAB | Facility: HOSPITAL | Age: 24
End: 2024-09-11
Attending: FAMILY MEDICINE
Payer: COMMERCIAL

## 2024-09-11 PROCEDURE — 87046 STOOL CULTR AEROBIC BACT EA: CPT | Performed by: FAMILY MEDICINE

## 2024-09-11 PROCEDURE — 87427 SHIGA-LIKE TOXIN AG IA: CPT

## 2024-09-11 PROCEDURE — 87045 FECES CULTURE AEROBIC BACT: CPT | Performed by: FAMILY MEDICINE

## 2024-09-11 PROCEDURE — 87077 CULTURE AEROBIC IDENTIFY: CPT | Performed by: FAMILY MEDICINE

## 2024-09-11 PROCEDURE — 87015 SPECIMEN INFECT AGNT CONCNTJ: CPT

## 2024-09-11 PROCEDURE — 87184 SC STD DISK METHOD PER PLATE: CPT | Performed by: FAMILY MEDICINE

## 2024-09-11 PROCEDURE — 87186 SC STD MICRODIL/AGAR DIL: CPT | Performed by: FAMILY MEDICINE

## 2024-09-22 ENCOUNTER — PATIENT MESSAGE (OUTPATIENT)
Dept: FAMILY MEDICINE CLINIC | Facility: CLINIC | Age: 24
End: 2024-09-22

## 2024-09-23 NOTE — TELEPHONE ENCOUNTER
From: Eve Garcia  To: Chiara Chavez  Sent: 9/22/2024 12:12 AM CDT  Subject: Salmonella results    Hi I am still testing positive for salmonella ~2 months later and still cannot go back to work. I am not having any symptoms besides pain in my left side just under my ribs. Is there anything I can do to help get rid of the salmonella or pain?    Thanks

## 2024-09-25 ENCOUNTER — OFFICE VISIT (OUTPATIENT)
Dept: FAMILY MEDICINE CLINIC | Facility: CLINIC | Age: 24
End: 2024-09-25
Payer: COMMERCIAL

## 2024-09-25 VITALS
HEART RATE: 88 BPM | SYSTOLIC BLOOD PRESSURE: 100 MMHG | WEIGHT: 119.38 LBS | OXYGEN SATURATION: 98 % | HEIGHT: 66 IN | BODY MASS INDEX: 19.19 KG/M2 | DIASTOLIC BLOOD PRESSURE: 64 MMHG

## 2024-09-25 DIAGNOSIS — K29.60 REFLUX GASTRITIS: ICD-10-CM

## 2024-09-25 DIAGNOSIS — Z22.39: Primary | ICD-10-CM

## 2024-09-25 PROCEDURE — 3074F SYST BP LT 130 MM HG: CPT | Performed by: STUDENT IN AN ORGANIZED HEALTH CARE EDUCATION/TRAINING PROGRAM

## 2024-09-25 PROCEDURE — 99213 OFFICE O/P EST LOW 20 MIN: CPT | Performed by: STUDENT IN AN ORGANIZED HEALTH CARE EDUCATION/TRAINING PROGRAM

## 2024-09-25 PROCEDURE — 3078F DIAST BP <80 MM HG: CPT | Performed by: STUDENT IN AN ORGANIZED HEALTH CARE EDUCATION/TRAINING PROGRAM

## 2024-09-25 PROCEDURE — 3008F BODY MASS INDEX DOCD: CPT | Performed by: STUDENT IN AN ORGANIZED HEALTH CARE EDUCATION/TRAINING PROGRAM

## 2024-09-25 NOTE — PROGRESS NOTES
[facility]    Chief Complaint   Patient presents with    Follow - Up     Salmonella last 2 months. And having pain in the left side.         HPI:   Eve Garcia is 24 year old patient presenting for the followin. Salmonella. Patient was [place] for acute infection [place] and had bloody diarrhea. She works as a  and needs two negative tests to go back to work. Today, notes no diarrhea, no fevers. Has mild intermittent LUQ pain. Worst with eating. No caffeine. Gluten free, dairy free. Pain is 3/10 in severity, comes and goes. No low abdominal pain or crampy pain. No hematochezia or melena.    PMH:  Patient Active Problem List   Diagnosis    Syncope and collapse    Abnormal EEG      SH: reviewed     FH: reviewed        ROS: full 10 point review of systems done and otherwise negative.      Healthcare Maintenance:       PE:  Vital Signs    24 1110   BP: 100/64   Pulse: 88   PainSc: 3 - (Mild)   PainLoc: Abdomen     Wt Readings from Last 3 Encounters:   24 119 lb 6.4 oz (54.2 kg)   09/10/24 120 lb (54.4 kg)   24 120 lb (54.4 kg)     Body mass index is 19.27 kg/m².     Physical Exam:  GEN: Well-appearing, NAD, nontoxic  CARD: RRR, no m/r/g  PULM: CTA michael  ABD: soft, nt, nd  EXT: No gross deformity  NEURO: no gross deficit  PSYCH: normal affect, thought process linear     Admission on 09/10/2024, Discharged on 09/10/2024   Component Date Value Ref Range Status    WBC 09/10/2024 6.0  4.0 - 11.0 x10(3) uL Final    RBC 09/10/2024 4.62  3.80 - 5.30 x10(6)uL Final    HGB 09/10/2024 13.7  12.0 - 16.0 g/dL Final    HCT 09/10/2024 39.1  35.0 - 48.0 % Final    PLT 09/10/2024 296.0  150.0 - 450.0 10(3)uL Final    MCV 09/10/2024 84.6  80.0 - 100.0 fL Final    MCH 09/10/2024 29.7  26.0 - 34.0 pg Final    MCHC 09/10/2024 35.0  31.0 - 37.0 g/dL Final    RDW 09/10/2024 12.3  % Final    Neutrophil Absolute Prelim 09/10/2024 3.34  1.50 - 7.70 x10 (3) uL Final     Neutrophil Absolute 09/10/2024 3.34  1.50 - 7.70 x10(3) uL Final    Lymphocyte Absolute 09/10/2024 2.18  1.00 - 4.00 x10(3) uL Final    Monocyte Absolute 09/10/2024 0.40  0.10 - 1.00 x10(3) uL Final    Eosinophil Absolute 09/10/2024 0.03  0.00 - 0.70 x10(3) uL Final    Basophil Absolute 09/10/2024 0.03  0.00 - 0.20 x10(3) uL Final    Immature Granulocyte Absolute 09/10/2024 0.01  0.00 - 1.00 x10(3) uL Final    Neutrophil % 09/10/2024 55.7  % Final    Lymphocyte % 09/10/2024 36.4  % Final    Monocyte % 09/10/2024 6.7  % Final    Eosinophil % 09/10/2024 0.5  % Final    Basophil % 09/10/2024 0.5  % Final    Immature Granulocyte % 09/10/2024 0.2  % Final    Glucose 09/10/2024 88  70 - 99 mg/dL Final    Sodium 09/10/2024 139  136 - 145 mmol/L Final    Potassium 09/10/2024 3.6  3.5 - 5.1 mmol/L Final    Chloride 09/10/2024 106  98 - 112 mmol/L Final    CO2 09/10/2024 26.0  21.0 - 32.0 mmol/L Final    Anion Gap 09/10/2024 7  0 - 18 mmol/L Final    BUN 09/10/2024 8 (L)  9 - 23 mg/dL Final    Creatinine 09/10/2024 0.76  0.55 - 1.02 mg/dL Final    Calcium, Total 09/10/2024 10.0  8.7 - 10.4 mg/dL Final    Calculated Osmolality 09/10/2024 286  275 - 295 mOsm/kg Final    eGFR-Cr 09/10/2024 112  >=60 mL/min/1.73m2 Final   [facility] Lab Encounter on 09/10/2024   Component Date Value Ref Range Status    Stool Culture 09/10/2024 4+ growth Salmonella group (A)   Final    E Coli Shigatoxin 2024 Negative for Shigatoxins  Negative Final   Telephone on 2024   Component Date Value Ref Range Status    Stool Culture 2024 4+ growth Salmonella group (A)   Final   [facility] Lab Encounter on 2024   Component Date Value Ref Range Status    Stool Culture 2024 4+ growth Salmonella group (A)   Final    E Coli Shigatoxin 2024 Negative for Shigatoxins  Negative Final        A/P: Eve Garcia is 24 year old presenting for the followin. Carrier of Salmonella species - asymptomatic at this point. Continues to have  positive tests. Agreeable to ID referral. Hold off additional antibiotics at this time.  - INFECTIOUS DISEASE - INTERNAL     2. Intermittent RUQ pain - trial pepcid PRN. Does not seem to be related to  prior salmonella infection.     Outpatient Encounter Medications as of 9/25/2024   Medication Sig Dispense Refill    acidophilus-pectin Oral Cap Take 1 capsule by mouth daily.      Digestive Enzymes (ENZYME DIGEST OR) Take by mouth.       No facility-administered encounter medications on file as of 9/25/2024.           Side effects, risks and benefits of medications were explained.  The patient or responsible adult showed the ability to learn, asked appropriate questions.  There were no barriers to learning and they verbalized understanding of the treatment plan.     Medication list provided to patient and /or family member.        Katina Rico MD

## 2024-10-02 ENCOUNTER — TELEPHONE (OUTPATIENT)
Dept: FAMILY MEDICINE CLINIC | Facility: CLINIC | Age: 24
End: 2024-10-02

## 2024-10-02 DIAGNOSIS — A02.0 SALMONELLA ENTERITIS: Primary | ICD-10-CM

## 2024-10-09 ENCOUNTER — LAB ENCOUNTER (OUTPATIENT)
Dept: LAB | Age: 24
End: 2024-10-09
Attending: FAMILY MEDICINE
Payer: COMMERCIAL

## 2024-10-09 ENCOUNTER — LAB ENCOUNTER (OUTPATIENT)
Dept: LAB | Age: 24
End: 2024-10-09
Attending: INTERNAL MEDICINE
Payer: COMMERCIAL

## 2024-10-09 DIAGNOSIS — R79.89 ABNORMAL LFTS: ICD-10-CM

## 2024-10-09 LAB
ALBUMIN SERPL-MCNC: 5.2 G/DL (ref 3.2–4.8)
ALP LIVER SERPL-CCNC: 62 U/L
ALT SERPL-CCNC: 41 U/L
AST SERPL-CCNC: 21 U/L (ref ?–34)
BILIRUB DIRECT SERPL-MCNC: 0.9 MG/DL (ref ?–0.3)
BILIRUB SERPL-MCNC: 2.9 MG/DL (ref 0.3–1.2)
PROT SERPL-MCNC: 7.8 G/DL (ref 5.7–8.2)

## 2024-10-09 PROCEDURE — 36415 COLL VENOUS BLD VENIPUNCTURE: CPT

## 2024-10-09 PROCEDURE — 80076 HEPATIC FUNCTION PANEL: CPT

## 2024-10-10 ENCOUNTER — LAB ENCOUNTER (OUTPATIENT)
Dept: LAB | Facility: HOSPITAL | Age: 24
End: 2024-10-10
Attending: FAMILY MEDICINE
Payer: COMMERCIAL

## 2024-10-10 DIAGNOSIS — A02.0 SALMONELLA ENTERITIS: ICD-10-CM

## 2024-10-10 PROCEDURE — 87015 SPECIMEN INFECT AGNT CONCNTJ: CPT

## 2024-10-10 PROCEDURE — 87046 STOOL CULTR AEROBIC BACT EA: CPT

## 2024-10-10 PROCEDURE — 87045 FECES CULTURE AEROBIC BACT: CPT

## 2024-10-10 PROCEDURE — 87427 SHIGA-LIKE TOXIN AG IA: CPT

## 2024-10-11 ENCOUNTER — LAB ENCOUNTER (OUTPATIENT)
Dept: LAB | Facility: HOSPITAL | Age: 24
End: 2024-10-11
Attending: FAMILY MEDICINE
Payer: COMMERCIAL

## 2024-10-11 DIAGNOSIS — A02.0 SALMONELLA ENTERITIS: ICD-10-CM

## 2024-10-11 PROCEDURE — 87045 FECES CULTURE AEROBIC BACT: CPT

## 2024-10-11 PROCEDURE — 87427 SHIGA-LIKE TOXIN AG IA: CPT

## 2024-10-11 PROCEDURE — 87015 SPECIMEN INFECT AGNT CONCNTJ: CPT

## 2024-10-11 PROCEDURE — 87046 STOOL CULTR AEROBIC BACT EA: CPT

## 2024-10-15 ENCOUNTER — NURSE ONLY (OUTPATIENT)
Dept: FAMILY MEDICINE CLINIC | Facility: CLINIC | Age: 24
End: 2024-10-15
Payer: COMMERCIAL

## 2024-10-15 DIAGNOSIS — Z11.1 SCREENING FOR TUBERCULOSIS: Primary | ICD-10-CM

## 2024-10-15 DIAGNOSIS — Z23 NEED FOR VACCINATION: ICD-10-CM

## 2024-10-15 PROCEDURE — 90656 IIV3 VACC NO PRSV 0.5 ML IM: CPT | Performed by: FAMILY MEDICINE

## 2024-10-15 PROCEDURE — 86580 TB INTRADERMAL TEST: CPT | Performed by: FAMILY MEDICINE

## 2024-10-15 PROCEDURE — 90471 IMMUNIZATION ADMIN: CPT | Performed by: FAMILY MEDICINE

## 2024-10-17 ENCOUNTER — NURSE ONLY (OUTPATIENT)
Dept: FAMILY MEDICINE CLINIC | Facility: CLINIC | Age: 24
End: 2024-10-17
Payer: COMMERCIAL

## 2024-10-17 DIAGNOSIS — Z11.1 SCREENING FOR TUBERCULOSIS: Primary | ICD-10-CM

## 2024-10-17 NOTE — PROGRESS NOTES
Patient presents for TB read.  The TB test is read at the appropriate time as 0 mm negative.   Patient is given a copy of her results.

## 2025-05-01 NOTE — TELEPHONE ENCOUNTER
Pt is calling she needs a Quantaferrin gold and MMR Antibody test ordered for school please send to [facility].

## 2025-07-16 NOTE — H&P
[facility]  Obstetrics and Gynecology   History & Physical      Chief complaint:   Chief Complaint   Patient presents with    Wellness Visit     New Annual- Annual Exam        Subjective:     HPI: Eve Benoit is a 24 year old  presenting for WWE.    Her PCP is Chiara Chavez DO.  Declines chaperone    Menstrual history:  Menarche: 12 years old   Regular   +cramping first day, heating pad, castor oil pack   Flow is moderate     Birth control history:  - Current BC: none, condoms     - Past BC: Was on it for many years  - does natural things     Pelvic pain outside of menses:  No - just with intercourse     PCOS screen  - hirsutism, acne, male-pattern hair loss?  - snoring:  - weight gain:     Family planning / Preconception:  - Children desired: 2  - Desires pregnancy this year?: eventually - not rushing.  In school, occupation therapy.    - Prenatal vitamin?: no       Sexual history:  - Sexually active? Yes   - Sexual satisfaction?: painful forever - did pelvic floor PT.  It helped.  Did dilators, breathing exercises to help relax everything.    - Sexual preference: men -  bill   - STD history: no   - Interested in STD testing:doesn't   - Dyspareunia: above   - Post coital bleeding: no     Gynecologic Hygiene:  - history of recurrent bacteria vaginosis, yeast, UTIs: no   - Vulvar: Water, soap    - Douche? No  - Underwear fully covers vulva and liner is cotton? yes    Cancer Screening:  Family history of gynecologic cancers (including breast): no   - maternal:  - paternal:  Cervical CA:   - last pap/HPV:  negative pap   - due for one today? : yes   - History of abnl pap/HPV:   - received HPV vaccine: declines   Breast cancer:   - any breast issues today?: no     Other screening:  Lifestyle:   - Nutrition:    -- incorporates whole food/plant based foods to their diet: yes, tries to eat gluten and dairy free    -- avoids processed foods: \"trieds\"   - Exercise:    -- Tries to go  for a walk everyday, had ACL reconstruction   - Sleep: 8 hours per night on average  - Stress mgt / coping strategies: going on a walk or outside, shower   - People who support your health: mom and    - Health goal for this year: to continue to being healthy in the setting of being in her clinicals with occupational therapy    OB History  OB History    Para Term  AB Living   0 0 0 0 0 0   SAB IAB Ectopic Multiple Live Births   0 0 0 0 0     OB History    Para Term  AB Living   0 0 0 0 0 0   SAB IAB Ectopic Multiple Live Births   0 0 0 0 0       ROS negative unless otherwise stated above    Meds:  Medications Ordered Prior to Encounter[1]    PMH:  Past Medical History[2]    All:  Allergies[3]    PSH:  Past Surgical History[4]    Social History:  Short Social Hx on File[5]     Family History:  Family History[6]    Immunization History:  Immunization History   Administered Date(s) Administered    Covid-19 Vaccine Pfizer 30 mcg/0.3 ml 2021, 02/10/2021    HEP B, Adult 2023    Influenza 2023    Influenza Vaccine, trivalent (IIV3), PF 0.5mL (77237) 10/15/2024    Meningococcal-Menactra 2014, 2016, 2017    TDAP 2023    Tb Intradermal Test 10/15/2024    Varicella 2014         Objective:     Vitals:    25 1019   BP: 102/63   Pulse: 67   Weight: 126 lb 9.6 oz (57.4 kg)   Height: 66\"       Body mass index is 20.43 kg/m².    Physical Exam:     General: normal appearance  HEENT: normocephalic, no male pattern baldness, no acne  Breast: normal contour, no masses or lesions, no nipple discharge, chest hair not seen  Respiratory: normal work of breathing, no extra use of accessory muscles  Cardiac: normal rate  Abdominal: Nontender to palpation  MSK: normal range of motion  Neuro: normal movement, normal sensory  Skin: no abnormalities seen    Pelvic:  Speculum Exam:  - normal appearing vulva, perineum, anus  - normal appearing urethral meatus,  urethra  - Stage V lindsey pubic hair development  - normal appearing vagina, well estrogenized with ruggae, physiologic discharge  -  cervix without masses   Bimanual exam:  - uterus is mobile and with normal descent.  No masses appreciated.  No uterine or adnexal tenderness.  No bladder pain  - Pelvic floor is non tender    Labs:  Lab Results   Component Value Date    WBC 6.0 09/10/2024    RBC 4.62 09/10/2024    HGB 13.7 09/10/2024    HCT 39.1 09/10/2024    MCV 84.6 09/10/2024    MCH 29.7 09/10/2024    MCHC 35.0 09/10/2024    RDW 12.3 09/10/2024    .0 09/10/2024        Lab Results   Component Value Date    GLU 88 09/10/2024    BUN 8 (L) 09/10/2024    CREATSERUM 0.76 09/10/2024    ANIONGAP 7 09/10/2024    GFRNAA 131 2014    GFRAA 150 2014    CA 10.0 09/10/2024    OSMOCALC 286 09/10/2024    ALKPHO 50 2025    AST 23 2025    ALT 20 2025    BILT 2.3 (H) 2025    TP 6.9 2025    ALB 4.6 2025    GLOBULIN 2.5 2024     09/10/2024    K 3.6 09/10/2024     09/10/2024    CO2 26.0 09/10/2024       No results found for: \"CHOLEST\", \"TRIG\", \"HDL\", \"LDL\", \"VLDL\", \"TCHDLRATIO\", \"NONHDLC\", \"CHOLHDLRATIO\", \"CALCNONHDL\"     Lab Results   Component Value Date    TSH 2.150 2023        No results found for: \"EAG\", \"A1C\"      Imaging:  No results found.     Assessment:     Eve Benoit is a 24 year old  female presenting for WWE.    #WWE  [ ] TSH, Vit D ordered, other WW labs with other providers     #Contraception  Condoms and NFP     #STD screening  [ ] f/u STD panel    #Cervical Cancer Screening  [ ] f/u Pap and HPV reflex  [ ] gardasil vaccine (available from 9 to 44 yo) - declines to but can readdress in the future    #Breast Cancer Screening  [X] clinical breast exam today    #Blood pressure screen  -   BP Readings from Last 3 Encounters:   25 102/63   10/02/24 112/70   24 100/64     - normal blood pressure today        #dyspareunia  #high pelvic floor tone  - vaginal estrogen twice a week   - discussed vibrating pelvic wand for myofascial release    #Tobacco screen   Tobacco:  She has never smoked tobacco.Never        #Lifestyle counseling based on recommendations from the American College of Lifestyle Medicine  1) Nutrition: extensive scientific evidence supports a diet that is predominantly whole-food and plant based as an important strategy in health optimization.  Such a diet is rich in fiber, antioxidants and is nutrient dense (ex. Minimally processed vegetables, fruits, whole grains, legumes, nuts and seeds)  2) Physical activity: at least 150 minutes of moderate exercise per week (anything that gets your heart beating faster).  You could divide this time into 30 minutes per day for 5 days.  2 of these days should be devoted to whole body muscle-strengthening/building activities (weight lifting, for example).  3) Sleep: 7 to 9 hours per night of restorative sleep.  You can use black out curtains, white noise machine and minimize screen time to do this.    4) Continue to avoid or limit risky substances like alcohol, nicotine, vaping, drugs, prescription opioids.  If you need help - through medication or counseling - to do this, please contact me so I can get you a referral or resources to support you.  5) Stress: Continue developing coping strategies to decrease stress.  6) Leverage the power of relationships and social networks to help reinforce health behaviors.  Studies show people are more successful at achieving health goals if the people they live with are supporting and even working towards those same health goals.    Return of care in 1 year or PRN     Elizabeth Riggins MD   [facility] - OBGYN    Note to patient and family:  The 21st Century Cures Act makes medical notes available to patients in the interest of transparency.  However, please be advised that this is a medical document.  It is intended as a peer to  peer communication.  It is written in medical language and may contain abbreviations or verbiage that are technical and unfamiliar.  It may appear blunt or direct.  Medical documents are intended to carry relevant information, facts as evident, and the clinical opinion of the practitioner.         [1]   Current Outpatient Medications on File Prior to Visit   Medication Sig Dispense Refill    azithromycin 250 MG Oral Tab Take 1 tablet (250 mg total) by mouth daily.      acidophilus-pectin Oral Cap Take 1 capsule by mouth daily.      Digestive Enzymes (ENZYME DIGEST OR) Take by mouth.       No current facility-administered medications on file prior to visit.   [2]   Past Medical History:   Abdominal pain    Salomonella    Anxiety    Bloating    Blood in the stool    Has subsided    Decorative tattoo    Depression    Diarrhea, unspecified    Has subsided    Flatulence/gas pain/belching    Food intolerance    Gluten & dairy free    Generalized anxiety disorder    Hemorrhoids    History of depression    History of mental disorder    Irregular bowel habits    Panic disorder without agoraphobia    Recurrent major depression in partial remission    Stress    Wears glasses    Weight loss   [3]   Allergies  Allergen Reactions    Hydrocodone-Acetaminophen DIZZINESS and NAUSEA AND VOMITING    Bleach     Pumpkin     Sulfa Antibiotics OTHER (SEE COMMENTS)     vomiting   [4]   Past Surgical History:  Procedure Laterality Date    Anterior cruciate ligament repair Right 03/2025    no complications   [5]   Social History  Socioeconomic History    Marital status:    Tobacco Use    Smoking status: Never    Smokeless tobacco: Never   Vaping Use    Vaping status: Never Used   Substance and Sexual Activity    Alcohol use: Not Currently     Alcohol/week: 0.0 - 1.0 standard drinks of alcohol     Comment: Havent drank since stomach problems 8/4/24    Drug use: Never    Sexual activity: Yes     Partners: Male     Birth  control/protection: OCP   [6]   Family History  Problem Relation Age of Onset    Prostate Cancer Father     No Known Problems Mother     Lipids Maternal Grandmother     Kidney Disease Maternal Grandmother     Cancer Maternal Grandmother         skin (unknown type)    Lipids Paternal Grandmother     Endometriosis Sister     Depression Sister     Prostate Cancer Paternal Grandfather